# Patient Record
Sex: FEMALE | Race: WHITE | NOT HISPANIC OR LATINO | Employment: FULL TIME | URBAN - METROPOLITAN AREA
[De-identification: names, ages, dates, MRNs, and addresses within clinical notes are randomized per-mention and may not be internally consistent; named-entity substitution may affect disease eponyms.]

---

## 2017-06-07 ENCOUNTER — AMBULATORY - HEALTHEAST (OUTPATIENT)
Dept: MULTI SPECIALTY CLINIC | Facility: CLINIC | Age: 27
End: 2017-06-07

## 2017-06-07 LAB — PAP SMEAR - HIM PATIENT REPORTED: NORMAL

## 2017-10-08 ENCOUNTER — HEALTH MAINTENANCE LETTER (OUTPATIENT)
Age: 27
End: 2017-10-08

## 2019-01-22 ENCOUNTER — RECORDS - HEALTHEAST (OUTPATIENT)
Dept: ADMINISTRATIVE | Facility: OTHER | Age: 29
End: 2019-01-22

## 2019-03-28 ENCOUNTER — COMMUNICATION - HEALTHEAST (OUTPATIENT)
Dept: OBGYN | Facility: CLINIC | Age: 29
End: 2019-03-28

## 2019-03-29 ENCOUNTER — ANESTHESIA - HEALTHEAST (OUTPATIENT)
Dept: OBGYN | Facility: HOSPITAL | Age: 29
End: 2019-03-29

## 2019-03-29 ENCOUNTER — SURGERY - HEALTHEAST (OUTPATIENT)
Dept: OBGYN | Facility: HOSPITAL | Age: 29
End: 2019-03-29

## 2019-03-29 ASSESSMENT — MIFFLIN-ST. JEOR: SCORE: 1528.49

## 2019-04-01 ENCOUNTER — COMMUNICATION - HEALTHEAST (OUTPATIENT)
Dept: SCHEDULING | Facility: CLINIC | Age: 29
End: 2019-04-01

## 2019-04-01 ENCOUNTER — HOME CARE/HOSPICE - HEALTHEAST (OUTPATIENT)
Dept: HOME HEALTH SERVICES | Facility: HOME HEALTH | Age: 29
End: 2019-04-01

## 2019-04-03 ENCOUNTER — HOME CARE/HOSPICE - HEALTHEAST (OUTPATIENT)
Dept: HOME HEALTH SERVICES | Facility: HOME HEALTH | Age: 29
End: 2019-04-03

## 2019-09-13 ENCOUNTER — OFFICE VISIT - HEALTHEAST (OUTPATIENT)
Dept: FAMILY MEDICINE | Facility: CLINIC | Age: 29
End: 2019-09-13

## 2019-09-13 ENCOUNTER — COMMUNICATION - HEALTHEAST (OUTPATIENT)
Dept: TELEHEALTH | Facility: CLINIC | Age: 29
End: 2019-09-13

## 2019-09-13 DIAGNOSIS — H66.90 ACUTE OTITIS MEDIA, UNSPECIFIED OTITIS MEDIA TYPE: ICD-10-CM

## 2019-09-19 ENCOUNTER — COMMUNICATION - HEALTHEAST (OUTPATIENT)
Dept: SCHEDULING | Facility: CLINIC | Age: 29
End: 2019-09-19

## 2019-09-20 ENCOUNTER — OFFICE VISIT - HEALTHEAST (OUTPATIENT)
Dept: FAMILY MEDICINE | Facility: CLINIC | Age: 29
End: 2019-09-20

## 2019-09-20 DIAGNOSIS — H66.004 RECURRENT ACUTE SUPPURATIVE OTITIS MEDIA OF RIGHT EAR WITHOUT SPONTANEOUS RUPTURE OF TYMPANIC MEMBRANE: ICD-10-CM

## 2019-10-21 ENCOUNTER — OFFICE VISIT - HEALTHEAST (OUTPATIENT)
Dept: FAMILY MEDICINE | Facility: CLINIC | Age: 29
End: 2019-10-21

## 2019-10-21 DIAGNOSIS — Z71.1 FEARED COMPLAINT WITHOUT DIAGNOSIS: ICD-10-CM

## 2019-11-08 ENCOUNTER — HEALTH MAINTENANCE LETTER (OUTPATIENT)
Age: 29
End: 2019-11-08

## 2020-02-23 ENCOUNTER — HEALTH MAINTENANCE LETTER (OUTPATIENT)
Age: 30
End: 2020-02-23

## 2020-11-11 ENCOUNTER — OFFICE VISIT - HEALTHEAST (OUTPATIENT)
Dept: INTERNAL MEDICINE | Facility: CLINIC | Age: 30
End: 2020-11-11

## 2020-11-11 DIAGNOSIS — Z76.89 ESTABLISHING CARE WITH NEW DOCTOR, ENCOUNTER FOR: ICD-10-CM

## 2020-11-11 DIAGNOSIS — Z00.00 HEALTHCARE MAINTENANCE: ICD-10-CM

## 2020-11-11 DIAGNOSIS — Z3A.09 9 WEEKS GESTATION OF PREGNANCY: ICD-10-CM

## 2020-11-11 LAB
ALBUMIN SERPL-MCNC: 4 G/DL (ref 3.5–5)
ALBUMIN UR-MCNC: NEGATIVE MG/DL
ALP SERPL-CCNC: 29 U/L (ref 45–120)
ALT SERPL W P-5'-P-CCNC: 12 U/L (ref 0–45)
ANION GAP SERPL CALCULATED.3IONS-SCNC: 9 MMOL/L (ref 5–18)
APPEARANCE UR: CLEAR
AST SERPL W P-5'-P-CCNC: 14 U/L (ref 0–40)
BILIRUB SERPL-MCNC: 0.6 MG/DL (ref 0–1)
BILIRUB UR QL STRIP: NEGATIVE
BUN SERPL-MCNC: 10 MG/DL (ref 8–22)
CALCIUM SERPL-MCNC: 9.2 MG/DL (ref 8.5–10.5)
CHLORIDE BLD-SCNC: 106 MMOL/L (ref 98–107)
CHOLEST SERPL-MCNC: 177 MG/DL
CO2 SERPL-SCNC: 22 MMOL/L (ref 22–31)
COLOR UR AUTO: YELLOW
CREAT SERPL-MCNC: 0.72 MG/DL (ref 0.6–1.1)
ERYTHROCYTE [DISTWIDTH] IN BLOOD BY AUTOMATED COUNT: 11.8 % (ref 11–14.5)
FASTING STATUS PATIENT QL REPORTED: YES
GFR SERPL CREATININE-BSD FRML MDRD: >60 ML/MIN/1.73M2
GLUCOSE BLD-MCNC: 91 MG/DL (ref 70–125)
GLUCOSE UR STRIP-MCNC: NEGATIVE MG/DL
HCT VFR BLD AUTO: 42.2 % (ref 35–47)
HDLC SERPL-MCNC: 62 MG/DL
HGB BLD-MCNC: 14.4 G/DL (ref 12–16)
HGB UR QL STRIP: NEGATIVE
KETONES UR STRIP-MCNC: NEGATIVE MG/DL
LDLC SERPL CALC-MCNC: 92 MG/DL
LEUKOCYTE ESTERASE UR QL STRIP: NEGATIVE
MCH RBC QN AUTO: 31 PG (ref 27–34)
MCHC RBC AUTO-ENTMCNC: 34 G/DL (ref 32–36)
MCV RBC AUTO: 91 FL (ref 80–100)
NITRATE UR QL: NEGATIVE
PH UR STRIP: 8.5 [PH] (ref 5–8)
PLATELET # BLD AUTO: 251 THOU/UL (ref 140–440)
PMV BLD AUTO: 7.8 FL (ref 7–10)
POTASSIUM BLD-SCNC: 3.9 MMOL/L (ref 3.5–5)
PROT SERPL-MCNC: 6.8 G/DL (ref 6–8)
RBC # BLD AUTO: 4.64 MILL/UL (ref 3.8–5.4)
SODIUM SERPL-SCNC: 137 MMOL/L (ref 136–145)
SP GR UR STRIP: 1.02 (ref 1–1.03)
TRIGL SERPL-MCNC: 113 MG/DL
UROBILINOGEN UR STRIP-ACNC: ABNORMAL
WBC: 5.7 THOU/UL (ref 4–11)

## 2020-11-11 ASSESSMENT — MIFFLIN-ST. JEOR: SCORE: 1390.14

## 2020-11-12 ENCOUNTER — COMMUNICATION - HEALTHEAST (OUTPATIENT)
Dept: INTERNAL MEDICINE | Facility: CLINIC | Age: 30
End: 2020-11-12

## 2020-11-12 LAB — 25(OH)D3 SERPL-MCNC: 21 NG/ML (ref 30–80)

## 2020-12-06 ENCOUNTER — HEALTH MAINTENANCE LETTER (OUTPATIENT)
Age: 30
End: 2020-12-06

## 2021-05-26 VITALS
TEMPERATURE: 98.2 F | RESPIRATION RATE: 16 BRPM | HEART RATE: 60 BPM | DIASTOLIC BLOOD PRESSURE: 76 MMHG | SYSTOLIC BLOOD PRESSURE: 116 MMHG | OXYGEN SATURATION: 98 %

## 2021-05-27 NOTE — TELEPHONE ENCOUNTER
Called pt re: induction.  She states she is not coming as she has been laboring at home with her .  She will keep me updated throughout the day

## 2021-05-27 NOTE — ANESTHESIA PROCEDURE NOTES
Spinal Block    Patient location during procedure: OR  Start time: 3/29/2019 4:29 PM  End time: 3/29/2019 4:36 PM  Reason for block: primary anesthetic    Staffing:  Performing  Anesthesiologist: Jalil Garcia MD    Preanesthetic Checklist  Completed: patient identified, risks, benefits, and alternatives discussed, timeout performed, consent obtained, airway assessed, oxygen available, suction available, emergency drugs available and hand hygiene performed  Spinal Block  Patient position: sitting  Prep: ChloraPrep  Patient monitoring: heart rate, cardiac monitor, continuous pulse ox and blood pressure  Approach: midline  Location: L2-3  Injection technique: single-shot  Needle type: pencil-tip   Needle gauge: 24 G    Assessment  Sensory level: not assessed at this time.  Events: paresthesia    Additional Notes:  1st attempt L3-4, unable to get past bone.  In at L2-3, transient paresthesia to left leg, needle redirected to right.  Free and clear CSF flow pre- and post-injection.

## 2021-05-27 NOTE — ANESTHESIA PROCEDURE NOTES
Peripheral Block    Patient location during procedure: OR  Start time: 3/29/2019 5:33 PM  End time: 3/29/2019 5:40 PM  post-op analgesia per surgeon order as noted in medical record  Staffing:  Performing  Anesthesiologist: Jalil Garcia MD  Preanesthetic Checklist  Completed: patient identified, site marked, risks, benefits, and alternatives discussed, timeout performed, consent obtained, airway assessed, oxygen available, suction available, emergency drugs available and hand hygiene performed  Peripheral Block  Block type: other, TAP  Prep: ChloraPrep  Patient position: supine  Patient monitoring: blood pressure, heart rate, continuous pulse oximetry and cardiac monitor  Laterality: bilateral  Injection technique: ultrasound guided    Ultrasound used to visualize needle placement in proximity to nerve being blocked: yes   Permanent ultrasound image captured for medical record  Sterile gel and probe cover used for ultrasound.    Needle  Needle type: Stimuplex   Needle gauge: 20G  Needle length: 4 in  no peripheral nerve catheter placed  Assessment  Injection assessment: no difficulty with injection, negative aspiration for heme, incremental injection and no paresthesia on injection  Additional Notes  Images printed and placed in pt's chart.

## 2021-05-27 NOTE — TELEPHONE ENCOUNTER
"Pt was discharged from [facility] for a , and is concerned about having a DVT. Pt notes pain in left calf, no pain in the right. Pt is rating it about a \"2\", but is concerned.   Per protocol pt needs to be evaluated within 4 hours. Advised pt to go to the ER.     Florencio Jeffers RN [facility] Triage/Medication Refill    Reason for Disposition    [1] Thigh or calf pain AND [2] only 1 side AND [3] present > 1 hour    Protocols used: POSTPARTUM - LEG PAIN-A-AH      "

## 2021-05-27 NOTE — ANESTHESIA POSTPROCEDURE EVALUATION
Patient: Lisa MEDELLIN Cross   SECTION  Anesthesia type: spinal    Patient location: Labor and Delivery  Last vitals:   Vitals:    19 0825   BP: 115/69   Pulse: (!) 57   Resp: 18   Temp: 36.4  C (97.6  F)   SpO2: 97%     Post vital signs: stable  Level of consciousness: awake and responds to simple questions  Post-anesthesia pain: pain controlled  Post-anesthesia nausea and vomiting: no  Pulmonary: unassisted, return to baseline  Cardiovascular: stable and blood pressure at baseline  Hydration: adequate  Anesthetic events: no    QCDR Measures:  ASA# 11 - Isamar-op Cardiac Arrest: ASA11B - Patient did NOT experience unanticipated cardiac arrest  ASA# 12 - Isamar-op Mortality Rate: ASA12B - Patient did NOT die  ASA# 13 - PACU Re-Intubation Rate: ASA13B - Patient did NOT require a new airway mgmt  ASA# 10 - Composite Anes Safety: ASA10A - No serious adverse event    Additional Notes:

## 2021-05-27 NOTE — ANESTHESIA PREPROCEDURE EVALUATION
"Anesthesia Evaluation      Patient summary reviewed   No history of anesthetic complications     Airway   Mallampati: II  Neck ROM: full   Pulmonary - negative ROS and normal exam                          Cardiovascular - normal exam  Exercise tolerance: > or = 4 METS   Neuro/Psych - negative ROS     Endo/Other    (+) pregnant     GI/Hepatic/Renal - negative ROS   (-) GERD     Other findings: Hg 14.3. Denies medical issues.      Dental - normal exam                        Anesthesia Plan  Planned anesthetic: spinal  C-S described as \"urgent\" by OB, appears we have time for a SAB.  Pt consented for this, duramorph, and TAP blocks should they be requested.  ASA 2     Anesthetic plan and risks discussed with: patient and spouse    Post-op plan: routine recovery          "

## 2021-05-27 NOTE — ANESTHESIA CARE TRANSFER NOTE
Last vitals:   Vitals:    03/29/19 1758   BP: 93/52   Pulse: 97   Resp: 16   Temp:  SpO2 97.5  98%     Patient's level of consciousness is drowsy  Spontaneous respirations: yes  Maintains airway independently: yes  Dentition unchanged: yes  Oropharynx: oropharynx clear of all foreign objects    QCDR Measures:  ASA# 20 - Surgical Safety Checklist: WHO surgical safety checklist completed prior to induction    PQRS# 430 - Adult PONV Prevention: 4558F - Pt received => 2 anti-emetic agents (different classes) preop & intraop  ASA# 8 - Peds PONV Prevention: NA - Not pediatric patient, not GA or 2 or more risk factors NOT present  PQRS# 424 - Isamar-op Temp Management: 4559F - At least one body temp DOCUMENTED => 35.5C or 95.9F within required timeframe  PQRS# 426 - PACU Transfer Protocol: - Transfer of care checklist used  ASA# 14 - Acute Post-op Pain: ASA14B - Patient did NOT experience pain >= 7 out of 10

## 2021-06-01 NOTE — TELEPHONE ENCOUNTER
Pt is calling in about a right ear infection she has had for over a week, and saw Dr. Vale on 9/13/2019. Pt was given antibiotics, but has completed them and still feels fullness in her ear, and has hearing loss in right ear. Pt also reports some ringing in her ear. Pt denies any drainage, and the pain has improved. Pt is mostly concerned about the hearing loss.   Per protocol pt should be seen within 4-24 hours. Pt agrees with plan, and was transferred to scheduling, but was not able to get an appointment, so she will go to the [facility] right away in the morning. Pt agrees to call back if symptoms worsen before she is able to be seen.     Florencio Jeffers, RN [facility] Triage/Medication Refill    Reason for Disposition    [1] Hearing loss in one or both ears AND [2] sudden onset AND [3] present now    Earache    Protocols used: HEARING LOSS OR CHANGE-A-AH

## 2021-06-01 NOTE — PROGRESS NOTES
ASSESSMENT:  1. Acute otitis media  Bilateral, however right is more pronounced than left.  - amoxicillin (AMOXIL) 500 MG capsule; Take 1 capsule (500 mg total) by mouth 3 (three) times a day for 5 days. Take 8 hours apart with food.  Dispense: 15 capsule; Refill: 0        PLAN:  1.  Amoxicillin 500 mg 3 times daily x5 days.  2.  She can continue Tylenol and/or Motrin.  3.  She can also try Sudafed for congestion.  4.  Follow-up as needed.    No orders of the defined types were placed in this encounter.    There are no discontinued medications.    Return in about 3 months (around 12/13/2019) for establish care with someone .    CHIEF COMPLAINT:  Chief Complaint   Patient presents with     Ear Pain     new patient with ear pain that she noticed this morning. Advil helped, but wanting to make sure nothing wrong over the weekend.      Illness     feels like sinus drainage as she has been sick the past week        SUBJECTIVE:  Lisa is a 29 y.o. female who comes in because of ear pain.  Patient is otherwise healthy for the past several days or so she has had some coughing and congestion but no fever chills.  She woke up this morning about 3 AM with acute right ear pain, she laid down and realize actually both ears her but the right more than the left.  She also is not hearing as well and sounds are more muffled.    She is breast-feeding a 6-month-old, but otherwise is been no other significant change in her health.  She is taking some Motrin for this.        REVIEW OF SYSTEMS:      All other systems are negative.    PFSH:  Immunization History   Administered Date(s) Administered     Influenza, inj, historic,unspecified 11/30/2018     Social History     Socioeconomic History     Marital status:      Spouse name: Ramin     Number of children: 1     Years of education: Not on file     Highest education level: Not on file   Occupational History     Occupation: Web researcher   Social Needs     Financial resource  strain: Not hard at all     Food insecurity:     Worry: Never true     Inability: Never true     Transportation needs:     Medical: No     Non-medical: No   Tobacco Use     Smoking status: Never Smoker     Smokeless tobacco: Never Used   Substance and Sexual Activity     Alcohol use: No     Frequency: Never     Binge frequency: Never     Drug use: No     Sexual activity: Yes     Partners: Male   Lifestyle     Physical activity:     Days per week: Not on file     Minutes per session: Not on file     Stress: Not on file   Relationships     Social connections:     Talks on phone: Not on file     Gets together: Not on file     Attends Roman [employer] service: Not on file     Active member of club or organization: Not on file     Attends meetings of clubs or organizations: Not on file     Relationship status: Not on file     Intimate partner violence:     Fear of current or ex partner: Not on file     Emotionally abused: Not on file     Physically abused: Not on file     Forced sexual activity: Not on file   Other Topics Concern     Not on file   Social History Narrative     Not on file     No past medical history on file.  Family History   Problem Relation Age of Onset     Diabetes type II Mother      Hypertension Mother        MEDICATIONS:  Current Outpatient Medications   Medication Sig Dispense Refill     prenatal no115-iron-folic acid 29 mg iron- 1 mg Chew Chew 1 tablet daily.       amoxicillin (AMOXIL) 500 MG capsule Take 1 capsule (500 mg total) by mouth 3 (three) times a day for 5 days. Take 8 hours apart with food. 15 capsule 0     oxyCODONE (ROXICODONE) 5 MG immediate release tablet Take 1-2 tablets (5-10 mg total) by mouth every 6 (six) hours as needed. 13 tablet 0     No current facility-administered medications for this visit.        TOBACCO USE:  Social History     Tobacco Use   Smoking Status Never Smoker   Smokeless Tobacco Never Used       VITALS:  Vitals:    09/13/19 1306   BP: 123/77   Pulse: 67   Temp: 98.1   F (36.7  C)   TempSrc: Oral   SpO2: 98%   Weight: 150 lb 3.2 oz (68.1 kg)     Wt Readings from Last 3 Encounters:   09/13/19 150 lb 3.2 oz (68.1 kg)   03/29/19 182 lb (82.6 kg)       PHYSICAL EXAM:  Constitutional:   Reveals a female who appears healthy.  Vitals: per nursing notes.  HEENT:  Ears:  External canals, right tympanic membrane is diffusely red, and slightly dull suggestive of fluid behind the membrane no evidence of perforation no earwax.  The left tympanic membrane is also red but not as red as the right.    Eyes:  EOMs full, PERRL.  Lungs: Clear to A&P without rales or wheezes.  Respiratory effort normal.  Cardiac:   Regular rate and rhythm, normal S1, S2, no murmur or gallop.  Musculoskeletal: No peripheral swelling.  Neuro:  Alert and oriented. Cranial nerves, motor, sensory exams are intact.  No gross focal deficits.  Psychiatric:  Memory intact, mood appropriate.    QUALITY MEASURES:      DATA REVIEWED:

## 2021-06-01 NOTE — PROGRESS NOTES
Assessment/Plan:         Lisa was seen today for ear pain.    Diagnoses and all orders for this visit:    Recurrent acute suppurative otitis media of right ear without spontaneous rupture of tympanic membrane: recurrence (more likely incomplete treatment) of otitis media. Will treat with with omnicef this time. Continue to push fluids. Discussed concerning symptoms for which to return.  -     cefdinir (OMNICEF) 300 MG capsule; Take 1 capsule (300 mg total) by mouth 2 (two) times a day for 10 days.                Plan of care was discussed with the patient and/or guardian. They verbalize understanding of the treatment options and plan of care.    Jennifer Jose       Subjective:        Lisa Hernandez is a 29 y.o. female who presents for right ear pain.   She was treated on 9/13 for otitis media bilateral with a 5d course of amoxicillin   Had the pain immediately resolve the next day. Completed the course. Now starting over the last couple of days, the pain in her right ear is back.   No fever, chills, nausea, dizziness.  Mild nasal congestion, no cough.     She is breastfeeding. Otherwise healthy. No daily meds except prenatal.          Objective:       Blood pressure 103/66, pulse 62, temperature 97.4  F (36.3  C), temperature source Oral, resp. rate 15, weight 149 lb (67.6 kg), SpO2 98 %, currently breastfeeding.   Gen: well appearing, no distress  Ears: Right TM is bulging, erythematous, loss of red light reflex, Left TM with only clear fluid.

## 2021-06-02 VITALS — WEIGHT: 182 LBS | BODY MASS INDEX: 30.32 KG/M2 | HEIGHT: 65 IN

## 2021-06-02 NOTE — PATIENT INSTRUCTIONS - HE
1. May use Tylenol as needed for pain relief.   2. Follow up if ear pain worsens, fever develops, or if ear pain becomes more persistent.

## 2021-06-02 NOTE — PROGRESS NOTES
Chief Complaint   Patient presents with     Ear Pain     Right ear        HPI:  Lisa Hernandez is a 29 y.o. female who presents today complaining of some intermittent right ear twinges of pain.  Patient was recently diagnosed with an ear infection that required 2 rounds of antibiotics.  Patient has had not had any persistent ear pain like she had previously with ear infections, but she wanted make sure that there was no early signs of development of the infection again.    History obtained from the patient.    Problem List:  2019: Pregnant  2019:  delivery delivered      No past medical history on file.    Social History     Tobacco Use     Smoking status: Never Smoker     Smokeless tobacco: Never Used   Substance Use Topics     Alcohol use: No     Frequency: Never     Binge frequency: Never       Review of Systems   Constitutional: Negative for fever.   HENT: Positive for ear pain. Negative for congestion and ear discharge.    Respiratory: Negative for cough.        Vitals:    10/21/19 1736   BP: 116/76   Patient Site: Right Arm   Patient Position: Sitting   Cuff Size: Adult Regular   Pulse: 60   Resp: 16   Temp: 98.2  F (36.8  C)   TempSrc: Oral   SpO2: 98%       Physical Exam  Constitutional:       General: She is not in acute distress.     Appearance: She is well-developed. She is not diaphoretic.   HENT:      Head: Normocephalic and atraumatic.      Right Ear: Tympanic membrane, ear canal and external ear normal.      Left Ear: Tympanic membrane, ear canal and external ear normal.   Eyes:      General:         Right eye: No discharge.         Left eye: No discharge.      Conjunctiva/sclera: Conjunctivae normal.   Pulmonary:      Effort: Pulmonary effort is normal. No respiratory distress.   Psychiatric:         Behavior: Behavior normal.         Thought Content: Thought content normal.         Judgement: Judgment normal.           Clinical Decision Making:  No current signs indicative of bacterial  infection.  Patient is reassured.  At the end of the encounter, I discussed results, diagnosis, medications. Discussed red flags for immediate return to clinic/ER, as well as indications for follow up if no improvement. Patient understood and agreed to plan. Patient was stable for discharge.    1. Feared complaint without diagnosis           Patient Instructions   1. May use Tylenol as needed for pain relief.   2. Follow up if ear pain worsens, fever develops, or if ear pain becomes more persistent.

## 2021-06-03 VITALS
WEIGHT: 149 LBS | TEMPERATURE: 97.4 F | SYSTOLIC BLOOD PRESSURE: 103 MMHG | RESPIRATION RATE: 15 BRPM | HEART RATE: 62 BPM | BODY MASS INDEX: 25.18 KG/M2 | OXYGEN SATURATION: 98 % | DIASTOLIC BLOOD PRESSURE: 66 MMHG

## 2021-06-03 VITALS
HEART RATE: 67 BPM | BODY MASS INDEX: 25.38 KG/M2 | OXYGEN SATURATION: 98 % | WEIGHT: 150.2 LBS | SYSTOLIC BLOOD PRESSURE: 123 MMHG | TEMPERATURE: 98.1 F | DIASTOLIC BLOOD PRESSURE: 77 MMHG

## 2021-06-05 VITALS
DIASTOLIC BLOOD PRESSURE: 62 MMHG | BODY MASS INDEX: 25.24 KG/M2 | SYSTOLIC BLOOD PRESSURE: 117 MMHG | WEIGHT: 151.5 LBS | OXYGEN SATURATION: 100 % | HEIGHT: 65 IN | HEART RATE: 60 BPM

## 2021-06-06 ENCOUNTER — ANESTHESIA - HEALTHEAST (OUTPATIENT)
Dept: OBGYN | Facility: CLINIC | Age: 31
End: 2021-06-06

## 2021-06-07 ENCOUNTER — COMMUNICATION - HEALTHEAST (OUTPATIENT)
Dept: SCHEDULING | Facility: CLINIC | Age: 31
End: 2021-06-07

## 2021-06-13 NOTE — PROGRESS NOTES
Assessment:     Healthy female exam.   All preventive exams are up-to-date.  Fasting labs will be done today.      This note has been dictated using voice recognition software. Any grammatical or context distortions are unintentional and inherent to the software    1. Establishing care with new doctor, encounter for     2. Healthcare maintenance  Urinalysis-UC if Indicated    Vitamin D, Total (25-Hydroxy)    Lipid Profile    HM2(CBC w/o Differential)    Comprehensive Metabolic Panel   3. 9 weeks gestation of pregnancy           There are no Patient Instructions on file for this visit.    Plan:          Return in about 1 year (around 11/11/2021) for Annual physical.    Subjective:       Chief Complaint   Patient presents with     Annual Exam     EC Pap 9-7-69-Patint is 9 Weeks pregnant       Lisa Hernandez is a 30 y.o. female who presents for an annual exam. No chronic medical problems. 9 GW, second pregnancy. Had CS in 2019. Will see OB/GYN this week.    Healthy Habits:   Regular Exercise: Yes  Sunscreen Use: Yes  Healthy Diet: Yes  Dental Visits Regularly: Yes  Seat Belt: Yes  Immunization status: up to date and documented.    Health Maintenance   Topic Date Due     HEPATITIS C SCREENING  1989     PREVENTIVE CARE VISIT  1989     HIV SCREENING  02/01/2004     TD 18+ HE  02/01/2007     TDAP ADULT ONE TIME DOSE  02/01/2007     ADVANCE CARE PLANNING  02/01/2007     PAP SMEAR  06/07/2020     INFLUENZA VACCINE RULE BASED (1) 08/01/2020     Pneumococcal Vaccine: Pediatrics (0 to 5 Years) and At-Risk Patients (6 to 64 Years)  Aged Out     HEPATITIS B VACCINES  Aged Out       Social History     Socioeconomic History     Marital status:      Spouse name: Ramin     Number of children: 1     Years of education: Not on file     Highest education level: Not on file   Occupational History     Occupation: Web researcher   Social Needs     Financial resource strain: Not hard at all     Food insecurity     Worry:  "Never true     Inability: Never true     Transportation needs     Medical: No     Non-medical: No   Tobacco Use     Smoking status: Never Smoker     Smokeless tobacco: Never Used   Substance and Sexual Activity     Alcohol use: No     Frequency: Never     Binge frequency: Never     Drug use: No     Sexual activity: Yes     Partners: Male   Lifestyle     Physical activity     Days per week: Not on file     Minutes per session: Not on file     Stress: Not on file   Relationships     Social connections     Talks on phone: Not on file     Gets together: Not on file     Attends Rastafari service: Not on file     Active member of club or organization: Not on file     Attends meetings of clubs or organizations: Not on file     Relationship status: Not on file     Intimate partner violence     Fear of current or ex partner: Not on file     Emotionally abused: Not on file     Physically abused: Not on file     Forced sexual activity: Not on file   Other Topics Concern     Not on file   Social History Narrative     Not on file       Family History   Problem Relation Age of Onset     Diabetes type II Mother      Hypertension Mother        Patient Active Problem List   Diagnosis     Pregnant      delivery delivered       Current Outpatient Medications on File Prior to Visit   Medication Sig Dispense Refill     prenatal no115-iron-folic acid 29 mg iron- 1 mg Chew Chew 1 tablet daily.       No current facility-administered medications on file prior to visit.        Review of Systems  A 12 point comprehensive review of systems was negative except as noted in HPI.         Objective:      /62   Pulse 60   Ht 5' 4.5\" (1.638 m)   Wt 151 lb 8 oz (68.7 kg)   SpO2 100%   BMI 25.60 kg/m      Body mass index is 25.6 kg/m .        Physical Exam:  General Appearance: Alert, cooperative, no distress, appears stated age.  Head: Normocephalic, without obvious abnormality, atraumatic  Eyes: PERRL, conjunctiva/corneas clear, " EOM's intact  Ears: Normal TM's and external ear canals, both ears  Nose: Nares normal, septum midline,mucosa normal, no drainage  Throat: Lips, mucosa, and tongue normal; teeth and gums normal  Neck: Supple, symmetrical, trachea midline, no adenopathy;  thyroid: not enlarged, symmetric, no tenderness/mass/nodules; no carotid bruit or JVD  Back: Symmetric, no curvature, ROM normal, no CVA tenderness.  Lungs: Clear to auscultation bilaterally, respirations unlabored.  Breasts: No breast masses, tenderness, asymmetry, or nipple discharge.  Heart: Regular rate and rhythm, S1 and S2 normal, no murmur, rub, or gallop.  Abdomen: Soft, non-tender, bowel sounds active all four quadrants,  no masses, no organomegaly.  Pelvic:Not done.  Extremities: Extremities normal, atraumatic, no cyanosis or edema.  Skin: Skin color, texture, turgor normal, no rashes or lesions.  Lymph nodes: Cervical, supraclavicular, and axillary nodes normal.  Neurologic: No focal neurological findings.

## 2021-06-21 NOTE — LETTER
Letter by Alla iDckinson MD at      Author: Alla Dickinson MD Service: -- Author Type: --    Filed:  Encounter Date: 11/12/2020 Status: (Other)         Lisa Hernandez  [address] [facility] [address]             November 12, 2020         Dear Ms. Hernandez,    Below are the results from your recent visit:    Resulted Orders   Urinalysis-UC if Indicated   Result Value Ref Range    Color, UA Yellow Colorless, Yellow, Straw, Light Yellow    Clarity, UA Clear Clear    Glucose, UA Negative Negative    Bilirubin, UA Negative Negative    Ketones, UA Negative Negative    Specific Gravity, UA 1.020 1.005 - 1.030    Blood, UA Negative Negative    pH, UA 8.5 (H) 5.0 - 8.0    Protein, UA Negative Negative mg/dL    Urobilinogen, UA 0.2 E.U./dL 0.2 E.U./dL, 1.0 E.U./dL    Nitrite, UA Negative Negative    Leukocytes, UA Negative Negative    Narrative    Microscopic not indicated  UC not indicated   Vitamin D, Total (25-Hydroxy)   Result Value Ref Range    Vitamin D, Total (25-Hydroxy) 21.0 (L) 30.0 - 80.0 ng/mL    Narrative    Deficiency <10.0 ng/mL  Insufficiency 10.0-29.9 ng/mL  Sufficiency 30.0-80.0 ng/mL  Toxicity (possible) >100.0 ng/mL   Lipid Profile   Result Value Ref Range    Triglycerides 113 <=149 mg/dL    Cholesterol 177 <=199 mg/dL    LDL Calculated 92 <=129 mg/dL    HDL Cholesterol 62 >=50 mg/dL    Patient Fasting > 8hrs? Yes    HM2(CBC w/o Differential)   Result Value Ref Range    WBC 5.7 4.0 - 11.0 thou/uL    RBC 4.64 3.80 - 5.40 mill/uL    Hemoglobin 14.4 12.0 - 16.0 g/dL    Hematocrit 42.2 35.0 - 47.0 %    MCV 91 80 - 100 fL    MCH 31.0 27.0 - 34.0 pg    MCHC 34.0 32.0 - 36.0 g/dL    RDW 11.8 11.0 - 14.5 %    Platelets 251 140 - 440 thou/uL    MPV 7.8 7.0 - 10.0 fL   Comprehensive Metabolic Panel   Result Value Ref Range    Sodium 137 136 - 145 mmol/L    Potassium 3.9 3.5 - 5.0 mmol/L    Chloride 106 98 - 107 mmol/L    CO2 22 22 - 31 mmol/L    Anion Gap, Calculation 9 5 - 18 mmol/L    Glucose 91 70  - 125 mg/dL    BUN 10 8 - 22 mg/dL    Creatinine 0.72 0.60 - 1.10 mg/dL    GFR MDRD Af Amer >60 >60 mL/min/1.73m2    GFR MDRD Non Af Amer >60 >60 mL/min/1.73m2    Bilirubin, Total 0.6 0.0 - 1.0 mg/dL    Calcium 9.2 8.5 - 10.5 mg/dL    Protein, Total 6.8 6.0 - 8.0 g/dL    Albumin 4.0 3.5 - 5.0 g/dL    Alkaline Phosphatase 29 (L) 45 - 120 U/L    AST 14 0 - 40 U/L    ALT 12 0 - 45 U/L    Narrative    Fasting Glucose reference range is 70-99 mg/dL per  American Diabetes Association (ADA) guidelines.       Stable results. Only vit D level is low. Please start taking 2000 international unit(s) vit D3 OTC daily.    Please call with questions or contact us using [employer].    Sincerely,        Electronically signed by Alla Dickinson MD

## 2021-06-26 NOTE — ANESTHESIA PROCEDURE NOTES
Epidural Block    Patient location during procedure: OB  Time Called: 6/6/2021 7:45 PM  Reason for Block:labor epidural  Staffing:  Performing  Anesthesiologist: Nayeli Santos MD  Preanesthetic Checklist  Completed: patient identified, risks, benefits, and alternatives discussed, timeout performed, consent obtained, airway assessed, oxygen available, suction available, emergency drugs available and hand hygiene performed  Procedure  Patient position: sitting  Prep: ChloraPrep  Patient monitoring: heart rate, blood pressure and continuous pulse oximetry  Approach: midline  Location: L3-L4  Injection technique: MARIFER saline  Number of Attempts:1  Needle  Needle type: Tuohy   Needle gauge: 18 G     Catheter in Space: 5  Assessment  Sensory level:  No complications      Additional Notes:  R/B/A discussed with pt who wished to proceed. Single pass for crisp MARIFER. Negative for heme. No parasthesias. Catheter threaded easily. Negative test dose. Pt tolerated procedure well. No complications.

## 2021-06-26 NOTE — ANESTHESIA PREPROCEDURE EVALUATION
Anesthesia Evaluation      Patient summary reviewed     Airway   Mallampati: II  Neck ROM: full   Pulmonary - negative ROS and normal exam                          Cardiovascular - negative ROS and normal exam   Neuro/Psych - negative ROS     Endo/Other    (+) pregnant     GI/Hepatic/Renal - negative ROS           Dental - normal exam                        Anesthesia Plan  Planned anesthetic: epidural    ASA 2     Anesthetic plan and risks discussed with: patient    Post-op plan: routine recovery

## 2021-06-26 NOTE — ANESTHESIA POSTPROCEDURE EVALUATION
Patient: Lisa Hernandez  * No procedures listed *  Anesthesia type: No value filed.    Patient location: Labor and Delivery  Last vitals: No vitals data found for the desired time range.    Post vital signs: stable  Level of consciousness: awake and responds to simple questions  Post-anesthesia pain: pain controlled  Post-anesthesia nausea and vomiting: no  Pulmonary: unassisted, return to baseline  Cardiovascular: stable and blood pressure at baseline  Hydration: adequate  Anesthetic events: no    QCDR Measures:  ASA# 11 - Isamar-op Cardiac Arrest: ASA11B - Patient did NOT experience unanticipated cardiac arrest  ASA# 12 - Isamar-op Mortality Rate: ASA12B - Patient did NOT die  ASA# 13 - PACU Re-Intubation Rate: ASA13B - Patient did NOT require a new airway mgmt  ASA# 10 - Composite Anes Safety: ASA10A - No serious adverse event    Additional Notes:Pain well controlled. Denies headache, nausea/vomiting. Pt ambulating and voiding with no issues. Motor function returned to baseline. No apparent anesthesia related complications. All questions and concerns answered.

## 2021-07-14 PROBLEM — Z34.90 PREGNANT: Status: RESOLVED | Noted: 2019-03-29 | Resolved: 2021-06-08

## 2021-08-25 ENCOUNTER — NURSE TRIAGE (OUTPATIENT)
Dept: NURSING | Facility: CLINIC | Age: 31
End: 2021-08-25

## 2021-08-26 ENCOUNTER — OFFICE VISIT (OUTPATIENT)
Dept: FAMILY MEDICINE | Facility: CLINIC | Age: 31
End: 2021-08-26
Payer: COMMERCIAL

## 2021-08-26 VITALS
WEIGHT: 159.3 LBS | DIASTOLIC BLOOD PRESSURE: 87 MMHG | BODY MASS INDEX: 27.13 KG/M2 | SYSTOLIC BLOOD PRESSURE: 120 MMHG | TEMPERATURE: 98.5 F | RESPIRATION RATE: 18 BRPM | OXYGEN SATURATION: 98 %

## 2021-08-26 DIAGNOSIS — R35.0 URINARY FREQUENCY: ICD-10-CM

## 2021-08-26 DIAGNOSIS — N64.4 NIPPLE PAIN: Primary | ICD-10-CM

## 2021-08-26 LAB
ALBUMIN UR-MCNC: 30 MG/DL
APPEARANCE UR: CLEAR
BACTERIA #/AREA URNS HPF: ABNORMAL /HPF
BILIRUB UR QL STRIP: ABNORMAL
COLOR UR AUTO: YELLOW
GLUCOSE UR STRIP-MCNC: NEGATIVE MG/DL
HGB UR QL STRIP: NEGATIVE
KETONES UR STRIP-MCNC: 15 MG/DL
LEUKOCYTE ESTERASE UR QL STRIP: NEGATIVE
MUCOUS THREADS #/AREA URNS LPF: PRESENT /LPF
NITRATE UR QL: NEGATIVE
PH UR STRIP: 5.5 [PH] (ref 5–8)
RBC #/AREA URNS AUTO: ABNORMAL /HPF
SP GR UR STRIP: 1.02 (ref 1–1.03)
SQUAMOUS #/AREA URNS AUTO: ABNORMAL /LPF
UROBILINOGEN UR STRIP-ACNC: 0.2 E.U./DL
WBC #/AREA URNS AUTO: ABNORMAL /HPF

## 2021-08-26 PROCEDURE — 81001 URINALYSIS AUTO W/SCOPE: CPT | Performed by: STUDENT IN AN ORGANIZED HEALTH CARE EDUCATION/TRAINING PROGRAM

## 2021-08-26 PROCEDURE — 99213 OFFICE O/P EST LOW 20 MIN: CPT | Performed by: STUDENT IN AN ORGANIZED HEALTH CARE EDUCATION/TRAINING PROGRAM

## 2021-08-26 NOTE — TELEPHONE ENCOUNTER
"Patient says she woke this am was doing ok but then started having aches, back hurts and stomach does not feel good. She is scheduled for a COVID-19 test tomorrow.  Patient has a 11 week old baby, and when she nurses on right side, it is painful to patient. She reports seeing white spots on her nipple. Baby has no white patches in her mouth.    Disposition: be seen w/i 2 weeks  Reviewed care advice with caller per RN triage protocol guideline.  Advised to call back with worsening symptoms, concerns or questions. Caller verbalized understanding.            Reason for Disposition    Breastfeeding questions about mother (breast symptoms or feeling sick)    [1] Breast pain AND [2] cause is not known    Additional Information    Negative: [1] SEVERE breast pain AND [2] fever > 103 F (39.4 C)    Negative: Patient sounds very sick or weak to the triager    Negative: [1Breast looks infected (spreading redness, feels hot or painful to touch) AND [2] fever    Negative: [1Breast looks infected (spreading redness, feels hot or painful to touch) AND [2] no fever    Negative: [1] Painful rash AND [2] multiple small blisters grouped together (i.e., dermatomal distribution or \"band\" or \"stripe\")    Negative: [1] Cuts, burns, or bruises of breasts AND [2] suspicious history for the injury    Negative: Breast lump    Negative: [1] Nipple discharge AND [2] bloody    Negative: Nipple is inverted (i.e., points inward)  (Exception: long-term physical characteristic, present for many years)    Negative: Dry flaking-peeling skin of nipple    Negative: Change in shape or appearance of breast    Negative: Dimpling of skin of breast  (i.e., skin looks like the outside of an orange peel)    Negative: [1] Breast tenderness and fullness AND [2] pregnant    Negative: [1] Nipple discharge AND [2] not bloody (e.g., clear, white, yellow, brown, green)    Negative: [1] Age < 12 weeks AND [2] fever 100.4 F (38.0 C) or higher rectally    Protocols " used: BREAST SYMPTOMS-A-AH, THRUSH-P-AH

## 2021-08-26 NOTE — PATIENT INSTRUCTIONS
Patient Education     Breastfeeding Holds  Step-by-Step    [place] last reviewed this educational content on 8/1/2020 2000-2021 [employer]. All rights reserved. This information is not intended as a substitute for professional medical care. Always follow your healthcare professional's instructions.

## 2021-08-26 NOTE — PROGRESS NOTES
There are no exam notes on file for this visit.  Chief Complaint   Patient presents with     feeling achy and stomach yesterday     fever last evening.  took tylenol.  woke up fever 101.8  questioning UTI or is breast feeding.  Right side breast pain and lump and is questioning possib infection there. Urinary frequency      Blood pressure 120/87, temperature 98.5  F (36.9  C), temperature source Oral, resp. rate 18, weight 72.3 kg (159 lb 4.8 oz), SpO2 98 %, currently breastfeeding.           HALLIE Gonzalez is a 31 year old  female with a PMH significant for:     Patient Active Problem List   Diagnosis     NO ACTIVE PROBLEMS     CARDIOVASCULAR SCREENING; LDL GOAL LESS THAN 160     She presents with complaints of right nipple pain and discomfort.  States that she sees a white lesion at the tip of her right nipple, present for the past few days.  Also describes feeling feverish yesterday evening, took 1 tablet of Tylenol with good resolution of her symptoms.  Patient wondering if she has infection in her right breast.  Associated symptoms include feeling achy with an upset stomach yesterday.  Denies any nausea, vomiting, fevers, chills, cough, congestion.  Thinks that she has more urinary frequency, but denies any vaginal irritation, dysuria, hematuria.        OBJECTIVE     Vitals:    08/26/21 1011   BP: 120/87   BP Location: Right arm   Patient Position: Sitting   Cuff Size: Adult Regular   Resp: 18   Temp: 98.5  F (36.9  C)   TempSrc: Oral   SpO2: 98%   Weight: 72.3 kg (159 lb 4.8 oz)     Body mass index is 27.13 kg/m .    Constitutional: Awake, alert, cooperative, no acute distress, and appears stated age.  Eyes: sclera clear, conjunctiva normal.  ENT: NC/AT.   Neck: Supple, symmetrical, trachea midline.   Chest: Right nipple with white lesion at the tip without any surrounding discharge, erythema, warmth.  Lungs: No increased WOB.   Abdomen: Soft, nontender, nondistended  Neuropsychiatric: Normal affect,  mood, orientation, memory and insight.  Skin: No visible rashes, erythema, pallor, petechia or purpura.    Results for orders placed or performed in visit on 08/26/21   UA macro with reflex to Microscopic and Culture - Clinc Collect     Status: Abnormal    Specimen: Urine, Clean Catch   Result Value Ref Range    Color Urine Yellow Colorless, Straw, Light Yellow, Yellow    Appearance Urine Clear Clear    Glucose Urine Negative Negative mg/dL    Bilirubin Urine Small (A) Negative    Ketones Urine 15  (A) Negative mg/dL    Specific Gravity Urine 1.025 1.005 - 1.030    Blood Urine Negative Negative    pH Urine 5.5 5.0 - 8.0    Protein Albumin Urine 30  (A) Negative mg/dL    Urobilinogen Urine 0.2 0.2, 1.0 E.U./dL    Nitrite Urine Negative Negative    Leukocyte Esterase Urine Negative Negative   Urine Microscopic     Status: Abnormal   Result Value Ref Range    Bacteria Urine Few (A) None Seen /HPF    RBC Urine None Seen 0-2 /HPF /HPF    WBC Urine None Seen 0-5 /HPF /HPF    Squamous Epithelials Urine None Seen None Seen /LPF    Mucus Urine Present (A) None Seen /LPF    Narrative    Urine Culture not indicated       ASSESSMENT AND PLAN     Lisa was seen today for feeling achy and stomach yesterday.    Diagnoses and all orders for this visit:    Nipple pain  Likely secondary to lactation.  Patient's daughter appears to have adequate suction, advised to rotate physician.  She does have a phone number from her pediatrician to call the lactation nurse for further management and evaluation.  Also provided patient with different breast-feeding holds.  No antibiotics indicated at this time.    Urinary frequency  UA does show some ketones, indicating possible dehydration.  Advised patient to stay hydrated continue taking prenatal vitamins while breast-feeding.  Return precautions advised.  -     UA macro with reflex to Microscopic and Culture - Clinc Collect  -     Urine Microscopic          Return if symptoms worsen or fail to  improve.    Catarino Harper MD  8/26/2021

## 2021-09-25 ENCOUNTER — HEALTH MAINTENANCE LETTER (OUTPATIENT)
Age: 31
End: 2021-09-25

## 2022-01-15 ENCOUNTER — HEALTH MAINTENANCE LETTER (OUTPATIENT)
Age: 32
End: 2022-01-15

## 2022-10-31 ASSESSMENT — ENCOUNTER SYMPTOMS
DYSURIA: 0
HEMATURIA: 0
PARESTHESIAS: 0
JOINT SWELLING: 0
ARTHRALGIAS: 0
DIZZINESS: 0
HEMATOCHEZIA: 0
FEVER: 0
PALPITATIONS: 0
CHILLS: 0
WEAKNESS: 0
HEARTBURN: 0
FREQUENCY: 0
CONSTIPATION: 0
HEADACHES: 0
DIARRHEA: 0
MYALGIAS: 0
ABDOMINAL PAIN: 0
NAUSEA: 0
SHORTNESS OF BREATH: 0
COUGH: 0
BREAST MASS: 0
NERVOUS/ANXIOUS: 0
SORE THROAT: 0
EYE PAIN: 0

## 2022-11-01 ENCOUNTER — OFFICE VISIT (OUTPATIENT)
Dept: INTERNAL MEDICINE | Facility: CLINIC | Age: 32
End: 2022-11-01
Payer: COMMERCIAL

## 2022-11-01 VITALS
DIASTOLIC BLOOD PRESSURE: 60 MMHG | BODY MASS INDEX: 24.92 KG/M2 | SYSTOLIC BLOOD PRESSURE: 98 MMHG | WEIGHT: 146 LBS | OXYGEN SATURATION: 98 % | HEART RATE: 57 BPM | HEIGHT: 64 IN

## 2022-11-01 DIAGNOSIS — E55.9 VITAMIN D DEFICIENCY: ICD-10-CM

## 2022-11-01 DIAGNOSIS — Z00.00 ANNUAL PHYSICAL EXAM: Primary | ICD-10-CM

## 2022-11-01 PROCEDURE — 99395 PREV VISIT EST AGE 18-39: CPT | Performed by: INTERNAL MEDICINE

## 2022-11-01 ASSESSMENT — ENCOUNTER SYMPTOMS
SHORTNESS OF BREATH: 0
CONSTIPATION: 0
JOINT SWELLING: 0
HEMATURIA: 0
NAUSEA: 0
SORE THROAT: 0
FEVER: 0
HEADACHES: 0
HEMATOCHEZIA: 0
DIARRHEA: 0
FREQUENCY: 0
DIZZINESS: 0
HEARTBURN: 0
PALPITATIONS: 0
MYALGIAS: 0
WEAKNESS: 0
PARESTHESIAS: 0
CHILLS: 0
ABDOMINAL PAIN: 0
DYSURIA: 0
NERVOUS/ANXIOUS: 0
EYE PAIN: 0
COUGH: 0
ARTHRALGIAS: 0
BREAST MASS: 0
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
## 2023-03-23 ENCOUNTER — LAB REQUISITION (OUTPATIENT)
Dept: LAB | Facility: CLINIC | Age: 33
End: 2023-03-23

## 2023-03-23 DIAGNOSIS — Z34.93 ENCOUNTER FOR SUPERVISION OF NORMAL PREGNANCY, UNSPECIFIED, THIRD TRIMESTER: ICD-10-CM

## 2023-03-23 LAB
BASOPHILS # BLD AUTO: 0.1 10E3/UL (ref 0–0.2)
BASOPHILS NFR BLD AUTO: 1 %
EOSINOPHIL # BLD AUTO: 0.1 10E3/UL (ref 0–0.7)
EOSINOPHIL NFR BLD AUTO: 1 %
ERYTHROCYTE [DISTWIDTH] IN BLOOD BY AUTOMATED COUNT: 12.6 % (ref 10–15)
HCT VFR BLD AUTO: 39.8 % (ref 35–47)
HGB BLD-MCNC: 13.3 G/DL (ref 11.7–15.7)
IMM GRANULOCYTES # BLD: 0 10E3/UL
IMM GRANULOCYTES NFR BLD: 0 %
LYMPHOCYTES # BLD AUTO: 1.8 10E3/UL (ref 0.8–5.3)
LYMPHOCYTES NFR BLD AUTO: 26 %
MCH RBC QN AUTO: 30.5 PG (ref 26.5–33)
MCHC RBC AUTO-ENTMCNC: 33.4 G/DL (ref 31.5–36.5)
MCV RBC AUTO: 91 FL (ref 78–100)
MONOCYTES # BLD AUTO: 0.3 10E3/UL (ref 0–1.3)
MONOCYTES NFR BLD AUTO: 5 %
NEUTROPHILS # BLD AUTO: 4.8 10E3/UL (ref 1.6–8.3)
NEUTROPHILS NFR BLD AUTO: 67 %
NRBC # BLD AUTO: 0 10E3/UL
NRBC BLD AUTO-RTO: 0 /100
PLATELET # BLD AUTO: 235 10E3/UL (ref 150–450)
RBC # BLD AUTO: 4.36 10E6/UL (ref 3.8–5.2)
WBC # BLD AUTO: 7.1 10E3/UL (ref 4–11)

## 2023-03-23 PROCEDURE — 86592 SYPHILIS TEST NON-TREP QUAL: CPT | Performed by: NURSE PRACTITIONER

## 2023-03-23 PROCEDURE — 87340 HEPATITIS B SURFACE AG IA: CPT | Performed by: NURSE PRACTITIONER

## 2023-03-23 PROCEDURE — 87389 HIV-1 AG W/HIV-1&-2 AB AG IA: CPT | Performed by: NURSE PRACTITIONER

## 2023-03-23 PROCEDURE — 87086 URINE CULTURE/COLONY COUNT: CPT | Performed by: NURSE PRACTITIONER

## 2023-03-23 PROCEDURE — 85025 COMPLETE CBC W/AUTO DIFF WBC: CPT | Performed by: NURSE PRACTITIONER

## 2023-03-23 PROCEDURE — 86803 HEPATITIS C AB TEST: CPT | Performed by: NURSE PRACTITIONER

## 2023-03-24 LAB
HBV SURFACE AG SERPL QL IA: NONREACTIVE
HCV AB SERPL QL IA: NONREACTIVE
HIV 1+2 AB+HIV1 P24 AG SERPL QL IA: NONREACTIVE
RPR SER QL: NONREACTIVE

## 2023-03-25 LAB — BACTERIA UR CULT: NORMAL

## 2023-04-19 ENCOUNTER — LAB REQUISITION (OUTPATIENT)
Dept: LAB | Facility: CLINIC | Age: 33
End: 2023-04-19

## 2023-04-19 DIAGNOSIS — Z34.81 ENCOUNTER FOR SUPERVISION OF OTHER NORMAL PREGNANCY, FIRST TRIMESTER: ICD-10-CM

## 2023-04-19 LAB
ABO/RH(D): NORMAL
ANTIBODY SCREEN: NEGATIVE
SPECIMEN EXPIRATION DATE: NORMAL

## 2023-04-19 PROCEDURE — 86850 RBC ANTIBODY SCREEN: CPT | Performed by: OBSTETRICS & GYNECOLOGY

## 2023-08-01 ENCOUNTER — LAB REQUISITION (OUTPATIENT)
Dept: LAB | Facility: CLINIC | Age: 33
End: 2023-08-01

## 2023-08-01 DIAGNOSIS — Z11.3 ENCOUNTER FOR SCREENING FOR INFECTIONS WITH A PREDOMINANTLY SEXUAL MODE OF TRANSMISSION: ICD-10-CM

## 2023-08-01 DIAGNOSIS — Z34.82 ENCOUNTER FOR SUPERVISION OF OTHER NORMAL PREGNANCY, SECOND TRIMESTER: ICD-10-CM

## 2023-08-01 LAB — T PALLIDUM AB SER QL: NONREACTIVE

## 2023-08-01 PROCEDURE — 86780 TREPONEMA PALLIDUM: CPT | Performed by: NURSE PRACTITIONER

## 2023-08-01 PROCEDURE — 86762 RUBELLA ANTIBODY: CPT | Performed by: NURSE PRACTITIONER

## 2023-08-02 LAB
RUBV IGG SERPL QL IA: 2.25 INDEX
RUBV IGG SERPL QL IA: POSITIVE

## 2023-10-04 ENCOUNTER — LAB REQUISITION (OUTPATIENT)
Dept: LAB | Facility: CLINIC | Age: 33
End: 2023-10-04
Payer: COMMERCIAL

## 2023-10-04 DIAGNOSIS — Z36.85 ENCOUNTER FOR ANTENATAL SCREENING FOR STREPTOCOCCUS B: ICD-10-CM

## 2023-10-04 PROCEDURE — 87653 STREP B DNA AMP PROBE: CPT | Mod: ORL | Performed by: OBSTETRICS & GYNECOLOGY

## 2023-10-05 LAB — GP B STREP DNA SPEC QL NAA+PROBE: POSITIVE

## 2023-10-27 ENCOUNTER — HOSPITAL ENCOUNTER (INPATIENT)
Facility: CLINIC | Age: 33
LOS: 2 days | Discharge: HOME HEALTH | End: 2023-10-29
Attending: OBSTETRICS & GYNECOLOGY | Admitting: OBSTETRICS & GYNECOLOGY
Payer: COMMERCIAL

## 2023-10-27 ENCOUNTER — ANESTHESIA EVENT (OUTPATIENT)
Dept: OBGYN | Facility: CLINIC | Age: 33
End: 2023-10-27
Payer: COMMERCIAL

## 2023-10-27 ENCOUNTER — ANESTHESIA (OUTPATIENT)
Dept: OBGYN | Facility: CLINIC | Age: 33
End: 2023-10-27
Payer: COMMERCIAL

## 2023-10-27 PROBLEM — Z34.90 PREGNANCY: Status: ACTIVE | Noted: 2023-10-27

## 2023-10-27 LAB
ABO/RH(D): NORMAL
ANTIBODY SCREEN: NEGATIVE
HGB BLD-MCNC: 13.2 G/DL (ref 11.7–15.7)
SPECIMEN EXPIRATION DATE: NORMAL
T PALLIDUM AB SER QL: NONREACTIVE

## 2023-10-27 PROCEDURE — 250N000011 HC RX IP 250 OP 636: Performed by: ANESTHESIOLOGY

## 2023-10-27 PROCEDURE — 250N000009 HC RX 250: Performed by: ANESTHESIOLOGY

## 2023-10-27 PROCEDURE — 710N000010 HC RECOVERY PHASE 1, LEVEL 2, PER MIN

## 2023-10-27 PROCEDURE — 370N000017 HC ANESTHESIA TECHNICAL FEE, PER MIN: Performed by: OBSTETRICS & GYNECOLOGY

## 2023-10-27 PROCEDURE — 86901 BLOOD TYPING SEROLOGIC RH(D): CPT | Performed by: PHYSICIAN ASSISTANT

## 2023-10-27 PROCEDURE — 258N000003 HC RX IP 258 OP 636: Performed by: PHYSICIAN ASSISTANT

## 2023-10-27 PROCEDURE — 999N000249 HC STATISTIC C-SECTION ON UNIT

## 2023-10-27 PROCEDURE — 36415 COLL VENOUS BLD VENIPUNCTURE: CPT | Performed by: PHYSICIAN ASSISTANT

## 2023-10-27 PROCEDURE — 250N000013 HC RX MED GY IP 250 OP 250 PS 637: Performed by: ANESTHESIOLOGY

## 2023-10-27 PROCEDURE — 85018 HEMOGLOBIN: CPT | Performed by: PHYSICIAN ASSISTANT

## 2023-10-27 PROCEDURE — 250N000013 HC RX MED GY IP 250 OP 250 PS 637: Performed by: PHYSICIAN ASSISTANT

## 2023-10-27 PROCEDURE — 120N000001 HC R&B MED SURG/OB

## 2023-10-27 PROCEDURE — 86850 RBC ANTIBODY SCREEN: CPT | Performed by: PHYSICIAN ASSISTANT

## 2023-10-27 PROCEDURE — 258N000003 HC RX IP 258 OP 636: Performed by: ANESTHESIOLOGY

## 2023-10-27 PROCEDURE — 250N000011 HC RX IP 250 OP 636: Mod: JZ | Performed by: PHYSICIAN ASSISTANT

## 2023-10-27 PROCEDURE — 999N000249 HC STATISTIC C-SECTION ON UNIT: Performed by: OBSTETRICS & GYNECOLOGY

## 2023-10-27 PROCEDURE — 360N000076 HC SURGERY LEVEL 3, PER MIN: Performed by: OBSTETRICS & GYNECOLOGY

## 2023-10-27 PROCEDURE — 250N000013 HC RX MED GY IP 250 OP 250 PS 637: Performed by: OBSTETRICS & GYNECOLOGY

## 2023-10-27 PROCEDURE — 86780 TREPONEMA PALLIDUM: CPT | Performed by: PHYSICIAN ASSISTANT

## 2023-10-27 PROCEDURE — 272N000001 HC OR GENERAL SUPPLY STERILE: Performed by: OBSTETRICS & GYNECOLOGY

## 2023-10-27 PROCEDURE — C9290 INJ, BUPIVACAINE LIPOSOME: HCPCS | Performed by: ANESTHESIOLOGY

## 2023-10-27 RX ORDER — DEXAMETHASONE SODIUM PHOSPHATE 4 MG/ML
INJECTION, SOLUTION INTRA-ARTICULAR; INTRALESIONAL; INTRAMUSCULAR; INTRAVENOUS; SOFT TISSUE PRN
Status: DISCONTINUED | OUTPATIENT
Start: 2023-10-27 | End: 2023-10-27

## 2023-10-27 RX ORDER — ACETAMINOPHEN 325 MG/1
975 TABLET ORAL ONCE
Status: COMPLETED | OUTPATIENT
Start: 2023-10-27 | End: 2023-10-27

## 2023-10-27 RX ORDER — ACETAMINOPHEN 325 MG/1
975 TABLET ORAL ONCE
Status: DISCONTINUED | OUTPATIENT
Start: 2023-10-27 | End: 2023-10-27 | Stop reason: HOSPADM

## 2023-10-27 RX ORDER — NALOXONE HYDROCHLORIDE 0.4 MG/ML
0.2 INJECTION, SOLUTION INTRAMUSCULAR; INTRAVENOUS; SUBCUTANEOUS
Status: DISCONTINUED | OUTPATIENT
Start: 2023-10-27 | End: 2023-10-29 | Stop reason: HOSPADM

## 2023-10-27 RX ORDER — OXYTOCIN/0.9 % SODIUM CHLORIDE 30/500 ML
PLASTIC BAG, INJECTION (ML) INTRAVENOUS CONTINUOUS PRN
Status: DISCONTINUED | OUTPATIENT
Start: 2023-10-27 | End: 2023-10-27

## 2023-10-27 RX ORDER — ACETAMINOPHEN 325 MG/1
975 TABLET ORAL EVERY 6 HOURS
Status: DISCONTINUED | OUTPATIENT
Start: 2023-10-27 | End: 2023-10-29 | Stop reason: HOSPADM

## 2023-10-27 RX ORDER — NALBUPHINE HYDROCHLORIDE 20 MG/ML
2.5-5 INJECTION, SOLUTION INTRAMUSCULAR; INTRAVENOUS; SUBCUTANEOUS EVERY 6 HOURS PRN
Status: DISCONTINUED | OUTPATIENT
Start: 2023-10-27 | End: 2023-10-29 | Stop reason: HOSPADM

## 2023-10-27 RX ORDER — OXYTOCIN/0.9 % SODIUM CHLORIDE 30/500 ML
100-340 PLASTIC BAG, INJECTION (ML) INTRAVENOUS CONTINUOUS PRN
Status: DISCONTINUED | OUTPATIENT
Start: 2023-10-27 | End: 2023-10-29 | Stop reason: HOSPADM

## 2023-10-27 RX ORDER — METHYLERGONOVINE MALEATE 0.2 MG/ML
200 INJECTION INTRAVENOUS
Status: DISCONTINUED | OUTPATIENT
Start: 2023-10-27 | End: 2023-10-29 | Stop reason: HOSPADM

## 2023-10-27 RX ORDER — CEFAZOLIN SODIUM 2 G/100ML
2 INJECTION, SOLUTION INTRAVENOUS
Status: DISCONTINUED | OUTPATIENT
Start: 2023-10-27 | End: 2023-10-27 | Stop reason: HOSPADM

## 2023-10-27 RX ORDER — METOCLOPRAMIDE HYDROCHLORIDE 5 MG/ML
10 INJECTION INTRAMUSCULAR; INTRAVENOUS EVERY 6 HOURS PRN
Status: DISCONTINUED | OUTPATIENT
Start: 2023-10-27 | End: 2023-10-29 | Stop reason: HOSPADM

## 2023-10-27 RX ORDER — FENTANYL CITRATE-0.9 % NACL/PF 10 MCG/ML
100 PLASTIC BAG, INJECTION (ML) INTRAVENOUS EVERY 5 MIN PRN
Status: DISCONTINUED | OUTPATIENT
Start: 2023-10-27 | End: 2023-10-27 | Stop reason: HOSPADM

## 2023-10-27 RX ORDER — LIDOCAINE 40 MG/G
CREAM TOPICAL
Status: DISCONTINUED | OUTPATIENT
Start: 2023-10-27 | End: 2023-10-29 | Stop reason: HOSPADM

## 2023-10-27 RX ORDER — OXYTOCIN 10 [USP'U]/ML
10 INJECTION, SOLUTION INTRAMUSCULAR; INTRAVENOUS
Status: DISCONTINUED | OUTPATIENT
Start: 2023-10-27 | End: 2023-10-29 | Stop reason: HOSPADM

## 2023-10-27 RX ORDER — CEFAZOLIN SODIUM 2 G/100ML
2 INJECTION, SOLUTION INTRAVENOUS SEE ADMIN INSTRUCTIONS
Status: DISCONTINUED | OUTPATIENT
Start: 2023-10-27 | End: 2023-10-27 | Stop reason: HOSPADM

## 2023-10-27 RX ORDER — FENTANYL CITRATE 50 UG/ML
INJECTION, SOLUTION INTRAMUSCULAR; INTRAVENOUS
Status: COMPLETED | OUTPATIENT
Start: 2023-10-27 | End: 2023-10-27

## 2023-10-27 RX ORDER — MORPHINE SULFATE 1 MG/ML
INJECTION, SOLUTION EPIDURAL; INTRATHECAL; INTRAVENOUS
Status: COMPLETED | OUTPATIENT
Start: 2023-10-27 | End: 2023-10-27

## 2023-10-27 RX ORDER — LIDOCAINE 40 MG/G
CREAM TOPICAL
Status: DISCONTINUED | OUTPATIENT
Start: 2023-10-27 | End: 2023-10-27 | Stop reason: HOSPADM

## 2023-10-27 RX ORDER — MISOPROSTOL 200 UG/1
400 TABLET ORAL
Status: DISCONTINUED | OUTPATIENT
Start: 2023-10-27 | End: 2023-10-29 | Stop reason: HOSPADM

## 2023-10-27 RX ORDER — MORPHINE SULFATE 0.5 MG/ML
150 INJECTION, SOLUTION EPIDURAL; INTRATHECAL; INTRAVENOUS ONCE
Status: DISCONTINUED | OUTPATIENT
Start: 2023-10-27 | End: 2023-10-27 | Stop reason: HOSPADM

## 2023-10-27 RX ORDER — ONDANSETRON 2 MG/ML
4 INJECTION INTRAMUSCULAR; INTRAVENOUS EVERY 6 HOURS PRN
Status: DISCONTINUED | OUTPATIENT
Start: 2023-10-27 | End: 2023-10-29 | Stop reason: HOSPADM

## 2023-10-27 RX ORDER — FENTANYL CITRATE 50 UG/ML
20 INJECTION, SOLUTION INTRAMUSCULAR; INTRAVENOUS ONCE
Status: DISCONTINUED | OUTPATIENT
Start: 2023-10-27 | End: 2023-10-27 | Stop reason: HOSPADM

## 2023-10-27 RX ORDER — PROCHLORPERAZINE MALEATE 10 MG
10 TABLET ORAL EVERY 6 HOURS PRN
Status: DISCONTINUED | OUTPATIENT
Start: 2023-10-27 | End: 2023-10-29 | Stop reason: HOSPADM

## 2023-10-27 RX ORDER — CARBOPROST TROMETHAMINE 250 UG/ML
250 INJECTION, SOLUTION INTRAMUSCULAR
Status: DISCONTINUED | OUTPATIENT
Start: 2023-10-27 | End: 2023-10-29 | Stop reason: HOSPADM

## 2023-10-27 RX ORDER — OXYCODONE HYDROCHLORIDE 5 MG/1
5 TABLET ORAL EVERY 4 HOURS PRN
Status: DISCONTINUED | OUTPATIENT
Start: 2023-10-27 | End: 2023-10-29 | Stop reason: HOSPADM

## 2023-10-27 RX ORDER — OXYTOCIN/0.9 % SODIUM CHLORIDE 30/500 ML
340 PLASTIC BAG, INJECTION (ML) INTRAVENOUS CONTINUOUS PRN
Status: DISCONTINUED | OUTPATIENT
Start: 2023-10-27 | End: 2023-10-29 | Stop reason: HOSPADM

## 2023-10-27 RX ORDER — BUPIVACAINE HYDROCHLORIDE 7.5 MG/ML
INJECTION, SOLUTION INTRASPINAL
Status: COMPLETED | OUTPATIENT
Start: 2023-10-27 | End: 2023-10-27

## 2023-10-27 RX ORDER — SODIUM CHLORIDE, SODIUM LACTATE, POTASSIUM CHLORIDE, CALCIUM CHLORIDE 600; 310; 30; 20 MG/100ML; MG/100ML; MG/100ML; MG/100ML
INJECTION, SOLUTION INTRAVENOUS CONTINUOUS
Status: DISCONTINUED | OUTPATIENT
Start: 2023-10-27 | End: 2023-10-27 | Stop reason: HOSPADM

## 2023-10-27 RX ORDER — OXYTOCIN/0.9 % SODIUM CHLORIDE 30/500 ML
340 PLASTIC BAG, INJECTION (ML) INTRAVENOUS CONTINUOUS PRN
Status: DISCONTINUED | OUTPATIENT
Start: 2023-10-27 | End: 2023-10-27 | Stop reason: HOSPADM

## 2023-10-27 RX ORDER — AMOXICILLIN 250 MG
1 CAPSULE ORAL 2 TIMES DAILY
Status: DISCONTINUED | OUTPATIENT
Start: 2023-10-27 | End: 2023-10-29 | Stop reason: HOSPADM

## 2023-10-27 RX ORDER — IBUPROFEN 800 MG/1
800 TABLET, FILM COATED ORAL EVERY 6 HOURS
Status: DISCONTINUED | OUTPATIENT
Start: 2023-10-28 | End: 2023-10-29 | Stop reason: HOSPADM

## 2023-10-27 RX ORDER — NALOXONE HYDROCHLORIDE 0.4 MG/ML
0.4 INJECTION, SOLUTION INTRAMUSCULAR; INTRAVENOUS; SUBCUTANEOUS
Status: DISCONTINUED | OUTPATIENT
Start: 2023-10-27 | End: 2023-10-29 | Stop reason: HOSPADM

## 2023-10-27 RX ORDER — MISOPROSTOL 200 UG/1
400 TABLET ORAL
Status: DISCONTINUED | OUTPATIENT
Start: 2023-10-27 | End: 2023-10-27 | Stop reason: HOSPADM

## 2023-10-27 RX ORDER — DEXTROSE, SODIUM CHLORIDE, SODIUM LACTATE, POTASSIUM CHLORIDE, AND CALCIUM CHLORIDE 5; .6; .31; .03; .02 G/100ML; G/100ML; G/100ML; G/100ML; G/100ML
INJECTION, SOLUTION INTRAVENOUS CONTINUOUS
Status: DISCONTINUED | OUTPATIENT
Start: 2023-10-27 | End: 2023-10-27

## 2023-10-27 RX ORDER — AMOXICILLIN 250 MG
2 CAPSULE ORAL 2 TIMES DAILY
Status: DISCONTINUED | OUTPATIENT
Start: 2023-10-27 | End: 2023-10-29 | Stop reason: HOSPADM

## 2023-10-27 RX ORDER — METHYLERGONOVINE MALEATE 0.2 MG/ML
200 INJECTION INTRAVENOUS
Status: DISCONTINUED | OUTPATIENT
Start: 2023-10-27 | End: 2023-10-27 | Stop reason: HOSPADM

## 2023-10-27 RX ORDER — CARBOPROST TROMETHAMINE 250 UG/ML
250 INJECTION, SOLUTION INTRAMUSCULAR
Status: DISCONTINUED | OUTPATIENT
Start: 2023-10-27 | End: 2023-10-27 | Stop reason: HOSPADM

## 2023-10-27 RX ORDER — OXYTOCIN 10 [USP'U]/ML
10 INJECTION, SOLUTION INTRAMUSCULAR; INTRAVENOUS
Status: DISCONTINUED | OUTPATIENT
Start: 2023-10-27 | End: 2023-10-27 | Stop reason: HOSPADM

## 2023-10-27 RX ORDER — MISOPROSTOL 200 UG/1
800 TABLET ORAL
Status: DISCONTINUED | OUTPATIENT
Start: 2023-10-27 | End: 2023-10-29 | Stop reason: HOSPADM

## 2023-10-27 RX ORDER — ONDANSETRON 2 MG/ML
INJECTION INTRAMUSCULAR; INTRAVENOUS PRN
Status: DISCONTINUED | OUTPATIENT
Start: 2023-10-27 | End: 2023-10-27

## 2023-10-27 RX ORDER — MISOPROSTOL 200 UG/1
800 TABLET ORAL
Status: DISCONTINUED | OUTPATIENT
Start: 2023-10-27 | End: 2023-10-27 | Stop reason: HOSPADM

## 2023-10-27 RX ORDER — BUPIVACAINE HYDROCHLORIDE 2.5 MG/ML
INJECTION, SOLUTION EPIDURAL; INFILTRATION; INTRACAUDAL
Status: COMPLETED | OUTPATIENT
Start: 2023-10-27 | End: 2023-10-27

## 2023-10-27 RX ORDER — ONDANSETRON 4 MG/1
4 TABLET, ORALLY DISINTEGRATING ORAL EVERY 6 HOURS PRN
Status: DISCONTINUED | OUTPATIENT
Start: 2023-10-27 | End: 2023-10-29 | Stop reason: HOSPADM

## 2023-10-27 RX ORDER — MODIFIED LANOLIN
OINTMENT (GRAM) TOPICAL
Status: DISCONTINUED | OUTPATIENT
Start: 2023-10-27 | End: 2023-10-29 | Stop reason: HOSPADM

## 2023-10-27 RX ORDER — BISACODYL 10 MG
10 SUPPOSITORY, RECTAL RECTAL DAILY PRN
Status: DISCONTINUED | OUTPATIENT
Start: 2023-10-29 | End: 2023-10-29 | Stop reason: HOSPADM

## 2023-10-27 RX ORDER — KETOROLAC TROMETHAMINE 30 MG/ML
30 INJECTION, SOLUTION INTRAMUSCULAR; INTRAVENOUS EVERY 6 HOURS
Status: COMPLETED | OUTPATIENT
Start: 2023-10-27 | End: 2023-10-28

## 2023-10-27 RX ORDER — METOCLOPRAMIDE 10 MG/1
10 TABLET ORAL EVERY 6 HOURS PRN
Status: DISCONTINUED | OUTPATIENT
Start: 2023-10-27 | End: 2023-10-29 | Stop reason: HOSPADM

## 2023-10-27 RX ORDER — CITRIC ACID/SODIUM CITRATE 334-500MG
30 SOLUTION, ORAL ORAL
Status: COMPLETED | OUTPATIENT
Start: 2023-10-27 | End: 2023-10-27

## 2023-10-27 RX ORDER — PROCHLORPERAZINE 25 MG
25 SUPPOSITORY, RECTAL RECTAL EVERY 12 HOURS PRN
Status: DISCONTINUED | OUTPATIENT
Start: 2023-10-27 | End: 2023-10-29 | Stop reason: HOSPADM

## 2023-10-27 RX ORDER — SIMETHICONE 80 MG
80 TABLET,CHEWABLE ORAL 4 TIMES DAILY PRN
Status: DISCONTINUED | OUTPATIENT
Start: 2023-10-27 | End: 2023-10-29 | Stop reason: HOSPADM

## 2023-10-27 RX ORDER — HYDROCORTISONE 25 MG/G
CREAM TOPICAL 3 TIMES DAILY PRN
Status: DISCONTINUED | OUTPATIENT
Start: 2023-10-27 | End: 2023-10-29 | Stop reason: HOSPADM

## 2023-10-27 RX ADMIN — BUPIVACAINE HYDROCHLORIDE IN DEXTROSE 1.6 ML: 7.5 INJECTION, SOLUTION SUBARACHNOID at 09:57

## 2023-10-27 RX ADMIN — ONDANSETRON 4 MG: 2 INJECTION INTRAMUSCULAR; INTRAVENOUS at 10:35

## 2023-10-27 RX ADMIN — BUPIVACAINE HYDROCHLORIDE 20 ML: 2.5 INJECTION, SOLUTION EPIDURAL; INFILTRATION; INTRACAUDAL at 10:57

## 2023-10-27 RX ADMIN — Medication 500 ML/HR: at 10:21

## 2023-10-27 RX ADMIN — PHENYLEPHRINE HYDROCHLORIDE 0.5 MCG/KG/MIN: 10 INJECTION INTRAVENOUS at 10:02

## 2023-10-27 RX ADMIN — SENNOSIDES AND DOCUSATE SODIUM 1 TABLET: 8.6; 5 TABLET ORAL at 22:38

## 2023-10-27 RX ADMIN — CEFAZOLIN SODIUM 2 G: 2 INJECTION, SOLUTION INTRAVENOUS at 10:06

## 2023-10-27 RX ADMIN — BUPIVACAINE 20 ML: 13.3 INJECTION, SUSPENSION, LIPOSOMAL INFILTRATION at 10:57

## 2023-10-27 RX ADMIN — ACETAMINOPHEN 975 MG: 325 TABLET ORAL at 09:22

## 2023-10-27 RX ADMIN — FENTANYL CITRATE 20 MCG: 50 INJECTION INTRAMUSCULAR; INTRAVENOUS at 09:57

## 2023-10-27 RX ADMIN — Medication 0.15 MG: at 09:57

## 2023-10-27 RX ADMIN — SODIUM CITRATE AND CITRIC ACID MONOHYDRATE 30 ML: 500; 334 SOLUTION ORAL at 09:23

## 2023-10-27 RX ADMIN — DEXAMETHASONE SODIUM PHOSPHATE 4 MG: 4 INJECTION, SOLUTION INTRA-ARTICULAR; INTRALESIONAL; INTRAMUSCULAR; INTRAVENOUS; SOFT TISSUE at 10:35

## 2023-10-27 RX ADMIN — SODIUM CHLORIDE, POTASSIUM CHLORIDE, SODIUM LACTATE AND CALCIUM CHLORIDE: 600; 310; 30; 20 INJECTION, SOLUTION INTRAVENOUS at 08:19

## 2023-10-27 ASSESSMENT — ACTIVITIES OF DAILY LIVING (ADL)
ADLS_ACUITY_SCORE: 18
ADLS_ACUITY_SCORE: 35
ADLS_ACUITY_SCORE: 18
ADLS_ACUITY_SCORE: 18

## 2023-10-27 NOTE — OP NOTE
[facility] LABOR & DELIVERY    SECTION - OPERATIVE NOTE      NAME:Lisa Hernandez  : 1990   MRN: [id number]   GESTATIONAL AGE: 10/31/2023    ADMISSION DATE: 10/27/2023     PCP:  Alla Dickinson     DATE OF SERVICE: 10/27/2023     PREOPERATIVE DIAGNOSIS:   33 year old y.o.  at 39w3d  previous C/S for repeat    POSTOPERATIVE DIAGNOSIS:  Same      PROCEDURE: Low transverse  section      SURGEON: Cammie Mayer DO    ASSISTANT: Dr. Jakob Brink    ANESTHESIA: spinal    Delivery QBL (mL): 641    DRAINS: Rojas catheter.    COMPLICATIONS: none    FINDINGS: Normal uterus, tubes and ovaries bilaterally.  Live female infant born with Apgars of 8 at one minute, 9 at 5 minutes, weight unavailable at time of dictation.    PROCEDURE:  Patient was met preoperatively where we discussed the procedure and the risks associated with the procedure.  She understood these to include but not limited to injury to adjacent organs including bowel, bladder, ureter, infection and bleeding. Questions were answered.  Consents were signed.      She was brought to the operating room in stable condition.  After induction of a spinal anesthetic, fetal heart tones were checked and were stable. She was carefully prepped and draped in sterile fashion for the procedure.  A timeout was then performed.      A Pfannenstiel skin incision was made with the scalpel and carried down to the underlying layer of fascia.  The fascia was was incised in the midline extended laterally with the Mulligan scissors.  The superior and inferior aspects of the rectus fascia were elevated up and the underlying rectus muscles dissected off with sharp and blunt techniques.  The rectus muscles were then  at the midline and the peritoneum was identified and entered.  This incision was then extended with good visualization of the bladder.  A bladder blade was introduced.  A low transverse uterine incision was made  revealing clear amniotic fluid.  The infant's head was then delivered atraumatically.  The shoulders and body were delivered without difficulty.  The cord was clamped x 2 and cut and the infant handed off to waiting nursing personnel.    The placenta was removed manually from the uterus.  The uterus was exteriorized, covered with a moist laparotomy sponge and cleared of all clots and debris.  The uterine incision was now closed with 0-vicryl in a running locked fashion.  Excellent hemostasis was obtained. The uterus was returned cavity.  The pericolic gutters were cleared of all clots and debris.  The uterine incision was again inspected and noted to be hemostatic.  The peritoneum was replaced.  The rectus muscles were brought together.  The fascia was closed with 0-vicryl and the skin was closed with Insorb staples.  Patient tolerated the procedure well.  Sponge, lap and needle counts were correct x two.    SPECIMENS SENT: cord segment    Cammie Mayer DO      CC: Cammie Mayer DO

## 2023-10-27 NOTE — ANESTHESIA POSTPROCEDURE EVALUATION
Patient: Lisa Hernandez    Procedure: Procedure(s):  REPEAT  SECTION       Anesthesia Type:  Spinal    Note:  Disposition: Inpatient   Postop Pain Control: Uneventful            Sign Out: Well controlled pain   PONV: No   Neuro/Psych: Uneventful            Sign Out: Acceptable/Baseline neuro status   Airway/Respiratory: Uneventful            Sign Out: Acceptable/Baseline resp. status   CV/Hemodynamics: Uneventful            Sign Out: Acceptable CV status; No obvious hypovolemia; No obvious fluid overload   Other NRE: NONE   DID A NON-ROUTINE EVENT OCCUR? No       Last vitals:  Vitals:    10/27/23 1130 10/27/23 1145 10/27/23 1200   BP: 126/75 122/75 134/76   Pulse: (!) 47 (!) 45 56   Resp: 16 16 16   Temp:   36.7  C (98  F)   SpO2: 98% 98% 97%       Electronically Signed By: Drake Taylor MD  2023  12:04 PM

## 2023-10-27 NOTE — PLAN OF CARE
Problem: Adult Inpatient Plan of Care  Goal: Optimal Comfort and Wellbeing  Outcome: Progressing   Goal Outcome Evaluation:      Plan of Care Reviewed With: patient    Overall Patient Progress: improvingOverall Patient Progress: improving.  Some anxiety, talking through things slow and explaining things.

## 2023-10-27 NOTE — H&P
[facility] LABOR & DELIVERY   HISTORY AND PHYSICAL UPDATE ADMISSION EXAM      NAME:Chavo Hernandez  : 1990   MRN: [id number]   GESTATIONAL AGE: 39w3d    ADMISSION DATE: 10/27/2023     PCP:  Alla Dickinson       Pregnancy History: This is a 32yo  @ 39w3d with EDC of 10/31/2023.  Patient is here for a RCS.    OBSTETRIC HISTORY:    OB History    Para Term  AB Living   2 2 2 0 0 2   SAB IAB Ectopic Multiple Live Births   0 0 0 0 2      # Outcome Date GA Lbr Reji/2nd Weight Sex Delivery Anes PTL Lv   2 Term 21 38w5d / 01:06 3.62 kg (7 lb 15.7 oz) F  EPI N THAD      Complications: Fetal Intolerance      Name: VINNIEFEMALE-CHAVO      Apgar1: 9  Apgar5: 9   1 Term 19 41w2d 11:00 / 01:51 3.629 kg (8 lb) M CS-LVertical Spinal N THAD      Complications: Fetal Intolerance      Name: VINNIEMALE-CHAVO      Apgar1: 1  Apgar5: 7       EDC: Estimated Date of Delivery: 10/31/2023    EGA: 39w3d      Prenatal Complications   Patient Active Problem List   Diagnosis    NO ACTIVE PROBLEMS    CARDIOVASCULAR SCREENING; LDL GOAL LESS THAN 160       Exam:      /83 (BP Location: Left arm, Patient Position: Semi-Baxter's, Cuff Size: Adult Regular)   Pulse (!) 49   Temp 98.4  F (36.9  C) (Oral)   Resp 16     Fetal heart Rate Tracing: reactive and reassuring  Contractions: acontractile    HEENT  negative  Heart       Lungs      Abdomen   Abdomen soft, non-tender. BS normal. No masses, organomegaly  Extremities  Normal, Warm, No cyanosis, no clubbing, No edema, and nontender    Vaginal exam: deferred    Assessment: 32yo  @ 39w3d here for [facility]     Plan: Admit - see IP orders  Prepare for  section  Risks and benefits were discussed with patient, including but not limited to bleeding, infection, injury to other organs.  Questions answered.  Consent obtained.      Prenatal record reviewed.    Cammie Mayer DO on 10/27/2023 at 9:00 AM

## 2023-10-27 NOTE — ANESTHESIA PREPROCEDURE EVALUATION
Anesthesia Pre-Procedure Evaluation    Patient: Lisa Hernandez   MRN: [id number] : 1990        Procedure : Procedure(s):  REPEAT  SECTION          Past Medical History:   Diagnosis Date     NO ACTIVE PROBLEMS       (vaginal birth after ) 2021      Past Surgical History:   Procedure Laterality Date      SECTION N/A 3/29/2019    Procedure:  SECTION;  Surgeon: Juani Maloney MD;  Location: [facility];  Service: Obstetrics     NO HISTORY OF SURGERY       WISDOM TOOTH EXTRACTION Bilateral     in high school      No Known Allergies   Social History     Tobacco Use     Smoking status: Never     Smokeless tobacco: Never   Substance Use Topics     Alcohol use: No      Wt Readings from Last 1 Encounters:   22 66.2 kg (146 lb)        Anesthesia Evaluation   Pt has had prior anesthetic. Type: Regional.    No history of anesthetic complications       ROS/MED HX  ENT/Pulmonary:  - neg pulmonary ROS     Neurologic:  - neg neurologic ROS     Cardiovascular:  - neg cardiovascular ROS     METS/Exercise Tolerance:     Hematologic:  - neg hematologic  ROS     Musculoskeletal:  - neg musculoskeletal ROS     GI/Hepatic:  - neg GI/hepatic ROS  (-) GERD   Renal/Genitourinary:  - neg Renal ROS     Endo:  - neg endo ROS     Psychiatric/Substance Use:  - neg psychiatric ROS     Infectious Disease:  - neg infectious disease ROS     Malignancy:  - neg malignancy ROS     Other:      (+) Possibly pregnant, , ,       Physical Exam    Airway        Mallampati: I   TM distance: > 3 FB   Neck ROM: full   Mouth opening: > 3 cm    Respiratory Devices and Support         Dental       (+) Minor Abnormalities - some fillings, tiny chips      Cardiovascular          Rhythm and rate: regular and normal     Pulmonary           breath sounds clear to auscultation       OUTSIDE LABS:  CBC:   Lab Results   Component Value Date    WBC 7.1 2023    WBC 10.3 2021    HGB 13.2 10/27/2023  "   HGB 13.3 03/23/2023    HCT 39.8 03/23/2023    HCT 38.0 06/06/2021     03/23/2023     06/06/2021     BMP:   Lab Results   Component Value Date     11/11/2020    POTASSIUM 3.9 11/11/2020    CHLORIDE 106 11/11/2020    CO2 22 11/11/2020    BUN 10 11/11/2020    CR 0.72 11/11/2020    GLC 91 11/11/2020     COAGS:   Lab Results   Component Value Date    INR 1.01 03/29/2019     POC: No results found for: \"BGM\", \"HCG\", \"HCGS\"  HEPATIC:   Lab Results   Component Value Date    ALBUMIN 4.0 11/11/2020    PROTTOTAL 6.8 11/11/2020    ALT 12 11/11/2020    AST 14 11/11/2020    ALKPHOS 29 (L) 11/11/2020    BILITOTAL 0.6 11/11/2020     OTHER:   Lab Results   Component Value Date    EBONIE 9.2 11/11/2020    TSH 0.63 03/26/2012       Anesthesia Plan    ASA Status:  2       Anesthesia Type: Spinal.              Consents    Anesthesia Plan(s) and associated risks, benefits, and realistic alternatives discussed. Questions answered and patient/representative(s) expressed understanding.     - Discussed: Risks, Benefits and Alternatives for the PROCEDURE were discussed     - Discussed with:  Patient            Postoperative Care    Pain management: intrathecal morphine, Peripheral nerve block (Single Shot).   PONV prophylaxis: Ondansetron (or other 5HT-3)     Comments:              Drake Taylor MD  "

## 2023-10-27 NOTE — ANESTHESIA CARE TRANSFER NOTE
Patient: Lisa Hernandez    Procedure: Procedure(s):  REPEAT  SECTION       Diagnosis: History of  section [Z98.891]  Other specified postprocedural states [Z98.890]  Diagnosis Additional Information: No value filed.    Anesthesia Type:   Spinal     Note:    Oropharynx: oropharynx clear of all foreign objects  Level of Consciousness: awake  Oxygen Supplementation: room air    Independent Airway: airway patency satisfactory and stable  Dentition: dentition unchanged  Vital Signs Stable: post-procedure vital signs reviewed and stable  Report to RN Given: handoff report given  Patient transferred to: Labor and Delivery    Handoff Report: Identifed the Patient, Identified the Reponsible Provider, Reviewed the pertinent medical history, Discussed the surgical course, Reviewed Intra-OP anesthesia mangement and issues during anesthesia, Set expectations for post-procedure period and Allowed opportunity for questions and acknowledgement of understanding      Vitals:  Vitals Value Taken Time   /77    Temp     Pulse 42    Resp     SpO2 98        Electronically Signed By: MARA Banks CRNA  2023  11:14 AM

## 2023-10-27 NOTE — ANESTHESIA PROCEDURE NOTES
TAP Procedure Note    Pre-Procedure   Staff -        Anesthesiologist:  Drake Taylor MD       Performed By: anesthesiologist       Location: OR       Procedure Start/Stop Times: 10/27/2023 10:57 AM and 10/27/2023 11:01 AM       Pre-Anesthestic Checklist: patient identified, IV checked, site marked, risks and benefits discussed, informed consent, monitors and equipment checked, pre-op evaluation, at physician/surgeon's request and post-op pain management  Timeout:       Correct Patient: Yes        Correct Procedure: Yes        Correct Site: Yes        Correct Position: Yes        Correct Laterality: Yes        Site Marked: Yes  Procedure Documentation  Procedure: TAP       Diagnosis: POST OP PAIN CONTROL       Laterality: bilateral       Patient Position: supine       Patient Prep/Sterile Barriers: sterile gloves, mask       Skin prep: Chloraprep       Needle Type: short bevel       Needle Gauge: 20.        Needle Length (Inches): 4        Ultrasound guided       1. Ultrasound was used to identify targeted nerve, plexus, vascular marker, or fascial plane and place a needle adjacent to it in real-time.       2. Ultrasound was used to visualize the spread of anesthetic in close proximity to the above referenced structure.       3. A permanent image is entered into the patient's record.       4. The visualized anatomic structures appeared normal.       5. There were no apparent abnormal pathologic findings.    Assessment/Narrative         The placement was negative for: blood aspirated, painful injection and site bleeding       Paresthesias: No.       Bolus given via needle. no blood aspirated via catheter.        Secured via.        Insertion/Infusion Method: Single Shot       Complications: none       Injection made incrementally with aspirations every 5 mL.    Medication(s) Administered   Bupivacaine 0.25% PF (Infiltration) - Infiltration   20 mL - 10/27/2023 10:57:00 AM  Bupivacaine liposome (Exparel) 1.3% LA inj  "susp (Infiltration) - Infiltration   20 mL - 10/27/2023 10:57:00 AM  Medication Administration Time: 10/27/2023 10:57 AM      FOR [facility] ONLY:   Pain Team Contact information: please page the Pain Team Via [employer]. Search \"Pain\". During daytime hours, please page the attending first. At night please page the resident first.      "

## 2023-10-27 NOTE — ANESTHESIA PROCEDURE NOTES
"Intrathecal injection Procedure Note    Pre-Procedure   Staff -        Anesthesiologist:  Drake Taylor MD       Performed By: anesthesiologist       Location: OR       Procedure Start/Stop Times: 10/27/2023 9:57 AM and 10/27/2023 10:01 AM       Pre-Anesthestic Checklist: patient identified, risks and benefits discussed, informed consent, monitors and equipment checked and pre-op evaluation  Timeout:       Correct Patient: Yes        Correct Procedure: Yes        Correct Site: Yes        Correct Position: Yes   Procedure Documentation  Procedure: intrathecal injection       Diagnosis: c section       Patient Position: sitting       Patient Prep/Sterile Barriers: sterile gloves, mask, patient draped       Skin prep: Chloraprep       Insertion Site: L3-4. (midline approach).       Needle Gauge: 24.        Needle Length (Inches): 4        Spinal Needle Type: Pencan       Introducer used       Introducer: 20 G       # of attempts: 1 and  # of redirects:  0    Assessment/Narrative         Paresthesias: No.       CSF fluid: clear.    Medication(s) Administered   0.75% Hyperbaric Bupivacaine (Intrathecal) - Intrathecal   1.6 mL - 10/27/2023 9:57:00 AM  Fentanyl PF (Intrathecal) - Intrathecal   20 mcg - 10/27/2023 9:57:00 AM  Morphine PF 1 mg/mL (Intrathecal) - Intrathecal   0.15 mg - 10/27/2023 9:57:00 AM  Medication Administration Time: 10/27/2023 9:57 AM      FOR [facility] ([facility] ONLY:   Pain Team Contact information: please page the Pain Team Via [employer]. Search \"Pain\". During daytime hours, please page the attending first. At night please page the resident first.      "

## 2023-10-28 LAB — HGB BLD-MCNC: 11.3 G/DL (ref 11.7–15.7)

## 2023-10-28 PROCEDURE — 36415 COLL VENOUS BLD VENIPUNCTURE: CPT | Performed by: OBSTETRICS & GYNECOLOGY

## 2023-10-28 PROCEDURE — 85018 HEMOGLOBIN: CPT | Performed by: OBSTETRICS & GYNECOLOGY

## 2023-10-28 PROCEDURE — 120N000001 HC R&B MED SURG/OB

## 2023-10-28 PROCEDURE — 250N000013 HC RX MED GY IP 250 OP 250 PS 637: Performed by: OBSTETRICS & GYNECOLOGY

## 2023-10-28 PROCEDURE — 250N000011 HC RX IP 250 OP 636: Mod: JZ | Performed by: OBSTETRICS & GYNECOLOGY

## 2023-10-28 RX ORDER — OXYCODONE HYDROCHLORIDE 5 MG/1
5 TABLET ORAL EVERY 6 HOURS PRN
Qty: 15 TABLET | Refills: 0 | Status: SHIPPED | OUTPATIENT
Start: 2023-10-28

## 2023-10-28 RX ADMIN — ACETAMINOPHEN 975 MG: 325 TABLET ORAL at 14:45

## 2023-10-28 RX ADMIN — ACETAMINOPHEN 975 MG: 325 TABLET ORAL at 08:47

## 2023-10-28 RX ADMIN — SENNOSIDES AND DOCUSATE SODIUM 2 TABLET: 8.6; 5 TABLET ORAL at 08:48

## 2023-10-28 RX ADMIN — IBUPROFEN 800 MG: 800 TABLET ORAL at 20:49

## 2023-10-28 RX ADMIN — IBUPROFEN 800 MG: 800 TABLET ORAL at 14:45

## 2023-10-28 RX ADMIN — ACETAMINOPHEN 975 MG: 325 TABLET ORAL at 20:48

## 2023-10-28 RX ADMIN — SENNOSIDES AND DOCUSATE SODIUM 2 TABLET: 8.6; 5 TABLET ORAL at 20:48

## 2023-10-28 RX ADMIN — KETOROLAC TROMETHAMINE 30 MG: 30 INJECTION, SOLUTION INTRAMUSCULAR; INTRAVENOUS at 00:15

## 2023-10-28 RX ADMIN — KETOROLAC TROMETHAMINE 30 MG: 30 INJECTION, SOLUTION INTRAMUSCULAR; INTRAVENOUS at 08:49

## 2023-10-28 RX ADMIN — ACETAMINOPHEN 975 MG: 325 TABLET ORAL at 00:15

## 2023-10-28 ASSESSMENT — ACTIVITIES OF DAILY LIVING (ADL)
ADLS_ACUITY_SCORE: 18

## 2023-10-28 NOTE — DISCHARGE SUMMARY
HOSPITAL DISCHARGE SUMMARY - VAGINAL BIRTH    Patient Name: Lisa Hernandez   YOB: 1990  Age: 33 year old  Medical Record Number: [id number]  Primary Physician: Alla Dickinson    Admission Date:  10/27/2023  Delivery Date:    Information for the patient's :  Migel Hernandez [[id number]]   10/27/2023      Discharge Date:  10/29/2023    REASON FOR ADMISSION: Labor and Delivery    DIAGNOSIS:    Intrauterine pregnancy at 39w3d    Information for the patient's :  Migel Hernandez [[id number]]   7 lbs 1 oz       PROCEDURES PERFORMED:   delivery     HISTORY OF PRESENT ILLNESS AND HOSPITAL COURSE: This is a 33 year old  female who underwent a delivery as above. Post partum course was unremarkable.     LABS:  Lab Results   Component Value Date    HGB 11.3 (L) 10/28/2023       DISPOSITION:  Home    CONDITION AT DISCHARGE:  Good/Stable    Discharge Medications:      Review of your medicines      You have not been prescribed any medications.         DISCHARGE PLAN for 10-  - Follow up with primary OB/GYN in 2 and 6 weeks  - Take medication as prescribed  - Physical activity: As tolerated, no heavy lifting. Pelvic rest.  - Diet:  Regular  - Medication:  Please see MAR  - Warning signs discussed with patient about when to call the clinic/hospital  - All questions and concerns were answered for the patient prior to discharge.     Beth Canchola MD 10/28/2023 10:13 AM

## 2023-10-28 NOTE — PROGRESS NOTES
"[facility]  Postpartum Day 3:     Subjective:  The patient feels well. Pain is well controlled with current medications. The patient is ambulating Often.  She is voiding and is not passing flatus. She has not had a bowel movement. The amount and color of the lochia is appropriate for the duration of recovery, patient denies clots. The baby is doing well and is Breast feeding.     Exam:   /75 (BP Location: Left arm)   Pulse 58   Temp 98  F (36.7  C) (Oral)   Resp 17   Ht 1.632 m (5' 4.25\")   Wt 81.2 kg (179 lb)   SpO2 100%   Breastfeeding Unknown   BMI 30.49 kg/m  .  General: NAD  Abdomen: soft, NT   Incision: C/D/I  Fundus: firm, nontender  Ext: no pain     Impression:   Normal postpartum course. POD#1 LTCS     Plan:   - Continue current care.  - DC Home tomorrow   - Follow up in 2 weeks for incisional check and 6 weeks for PP check.     Beth Canchola MD 10/28/2023 10:11 AM      "

## 2023-10-28 NOTE — LACTATION NOTE
This note was copied from a baby's chart.  Rounded on family for lactation support per patient request.  Armen delivered her 3rd baby and wanted some reassurance.  Armen  her first 2 for 9 months.  She had a healthy milk supply.  Armen has noticed that her baby is improving with her breastfeeding.  At this visit, baby was asleep in her bassinet having just fed.  This LC encouraged armen to feed at least every 3 hours and she feels that is happening.     Armen is concerned that baby has some muscle tension and a shallow gape.  Discussed gentle massage to baby's back, shoulders and jaws while holding her baby. Resources also provided per pt request for infant body work providers.    Reviewed use of education folder for self learning, lactation and community support, indicators to call MD and maternal/family well being.    Provided education and a resource/teaching sheet with QR codes for video support/education for:  Hand expressing and storing breastmilk  Achieving a Deep Asymmetrical Latch  Breastfeeding Positions  How to Choose a breast pump flange size   Side Lying paced bottle feeding if supplementation is needed.  Spectra breast pump use.    Questions encouraged and addressed.    Jaylin Thomas RNC, IBCLC

## 2023-10-28 NOTE — PLAN OF CARE
Day RN ([phone])     Recovery:  Pt recovering well from  at 1020 on 10/27/2023.  VSS and afebrile.  Pt states very minimal pain with surgical blocks in place, declining any pain intervention at this time.  CMS intact.  Dressing small amt of dried drainage that is marked.  Has ambulated with assistance at 1710.  Rojas cath in place until 1710 - removed and awaiting first void.  Tolerating regular diet well.  Fundus is firm, midline, and at the U.  Lochia wdl - no clots.  Breast feeding baby, bonding well.  Spouse at bedside - supportive of patient.  Plan is home with infant and  family on discharge.  Will continue to monitor.         Care Plan Problem    Problem: Adult Inpatient Plan of Care  Goal: Optimal Comfort and Wellbeing  Outcome: Progressing  Intervention: Provide Person-Centered Care  Recent Flowsheet Documentation  Taken 10/27/2023 1600 by Meghana Gaytan RN  Trust Relationship/Rapport:   care explained   choices provided   emotional support provided   empathic listening provided   questions answered   questions encouraged   reassurance provided   thoughts/feelings acknowledged     Problem: Postpartum ( Delivery)  Goal: Nausea and Vomiting Relief  Outcome: Progressing     Problem: Postpartum ( Delivery)  Goal: Effective Urinary Elimination  Outcome: Progressing

## 2023-10-28 NOTE — PLAN OF CARE
Goal Outcome Evaluation:    [id number]     Pt bonding with baby and vitals stable. Her fundus is midline and firm without massage and bleeding is scant. Her incision is covered with dressing that has a small amount of old drainage. Her pain is well managed with tylenol and ibuprofen. She is breastfeeding baby.         Problem: Postpartum ( Delivery)  Goal: Successful Parent Role Transition  Outcome: Progressing  Intervention: Support Parent Role Transition  Recent Flowsheet Documentation  Taken 10/28/2023 1816 by Digna Mcneil RN  Supportive Measures:   active listening utilized   self-care encouraged   self-reflection promoted  Parent-Child Attachment Promotion:   interaction encouraged   positive reinforcement provided   rooming-in promoted   skin-to-skin contact encouraged   strengths emphasized     Problem: Postpartum ( Delivery)  Goal: Absence of Infection Signs and Symptoms  Outcome: Progressing     Problem: Postpartum ( Delivery)  Goal: Optimal Pain Control and Function  Outcome: Progressing

## 2023-10-28 NOTE — PLAN OF CARE
Day RN ([phone])     Recovery:  Pt recovering well from  at 1020 on 10/27/2023.  VSS and afebrile.  Pain managed adequately with scheduled PO tylenol and Ibuprofen.  CMS intact.  Dressing small amt of dried drainage that is outlined w/o movement.  Has ambulated with assistance yesterday - now up ad jerome.  Voiding adequately.  Tolerating regular diet well.  Fundus is firm, midline, and at the U.  Lochia wdl - no clots.  Breast feeding baby, bonding well.  Spouse intermittently at bedside - supportive of patient.  Plan is home with infant and family on discharge.  Will continue to monitor.       Care Plan Problem    Problem: Adult Inpatient Plan of Care  Goal: Optimal Comfort and Wellbeing  Outcome: Progressing  Intervention: Provide Person-Centered Care  Recent Flowsheet Documentation  Taken 10/28/2023 0900 by Meghana Gaytan, RN  Trust Relationship/Rapport:   care explained   choices provided   emotional support provided   empathic listening provided   questions answered   questions encouraged   reassurance provided   thoughts/feelings acknowledged

## 2023-10-28 NOTE — PLAN OF CARE
"  Problem: Adult Inpatient Plan of Care  Goal: Plan of Care Review  Description: The Plan of Care Review/Shift note should be completed every shift.  The Outcome Evaluation is a brief statement about your assessment that the patient is improving, declining, or no change.  This information will be displayed automatically on your shift  note.  Outcome: Progressing  Goal: Patient-Specific Goal (Individualized)  Description: You can add care plan individualizations to a care plan. Examples of Individualization might be:  \"Parent requests to be called daily at 9am for status\", \"I have a hard time hearing out of my right ear\", or \"Do not touch me to wake me up as it startles  me\".  Outcome: Progressing  Goal: Absence of Hospital-Acquired Illness or Injury  Outcome: Progressing  Intervention: Prevent Skin Injury  Recent Flowsheet Documentation  Taken 10/28/2023 0015 by Nusrat Cline RN  Body Position: position changed independently  Taken 10/27/2023 2034 by Nusrat Cline RN  Body Position: position changed independently  Intervention: Prevent Infection  Recent Flowsheet Documentation  Taken 10/28/2023 0015 by Nusrat Cline RN  Infection Prevention: rest/sleep promoted  Taken 10/27/2023 2034 by Nusrat Cline RN  Infection Prevention: rest/sleep promoted  Goal: Optimal Comfort and Wellbeing  Outcome: Progressing  Intervention: Provide Person-Centered Care  Recent Flowsheet Documentation  Taken 10/28/2023 0015 by Nusrat Cline RN  Trust Relationship/Rapport:   care explained   choices provided   empathic listening provided   reassurance provided   thoughts/feelings acknowledged   questions answered  Taken 10/27/2023 2034 by Nusrat Cline RN  Trust Relationship/Rapport:   care explained   choices provided   empathic listening provided   reassurance provided   thoughts/feelings acknowledged   questions answered  Goal: Readiness for Transition of Care  Outcome: Progressing     Problem:  " Delivery  Goal: Bleeding is Controlled  Outcome: Progressing  Goal: Stable Fetal Wellbeing  Outcome: Progressing  Intervention: Promote and Monitor Fetal Wellbeing  Recent Flowsheet Documentation  Taken 10/28/2023 0015 by Nusrat Cline RN  Body Position: position changed independently  Taken 10/27/2023 2034 by Nusrat Cline RN  Body Position: position changed independently  Goal: Absence of Infection Signs and Symptoms  Outcome: Progressing  Intervention: Prevent or Manage Infection  Recent Flowsheet Documentation  Taken 10/28/2023 0015 by Nusrat Cline RN  Infection Prevention: rest/sleep promoted  Taken 10/27/2023 2034 by Nusrat Cline RN  Infection Prevention: rest/sleep promoted  Goal: Effective Oxygenation and Ventilation  Outcome: Progressing     Problem: Postpartum ( Delivery)  Goal: Successful Parent Role Transition  Outcome: Progressing  Goal: Hemostasis  Outcome: Progressing  Goal: Effective Bowel Elimination  Outcome: Progressing  Goal: Fluid and Electrolyte Balance  Outcome: Progressing  Goal: Absence of Infection Signs and Symptoms  Outcome: Progressing  Goal: Anesthesia/Sedation Recovery  Outcome: Progressing  Goal: Optimal Pain Control and Function  Outcome: Progressing  Goal: Nausea and Vomiting Relief  Outcome: Progressing  Goal: Effective Urinary Elimination  Outcome: Progressing  Goal: Effective Oxygenation and Ventilation  Outcome: Progressing  Intervention: Optimize Oxygenation and Ventilation  Recent Flowsheet Documentation  Taken 10/28/2023 0015 by Nusrat Cline RN  Head of Bed (HOB) Positioning: HOB at 30-45 degrees  Taken 10/27/2023 2034 by Nusrat Cline RN  Head of Bed (HOB) Positioning: HOB at 30-45 degrees     Problem: Breastfeeding  Goal: Effective Breastfeeding  Outcome: Progressing   Goal Outcome Evaluation:       Baby breastfeeding on demand every 2-3 hours. Pain controlled with tylenol and toradol. IV saline locked. Up independently, voiding without  difficulty. C/S incision clean, dry, intact with minimal drainage. Postpartum assessment WNL, see flowsheets for more information.  Caregiver education and support on-going.    Nusrat Cline RN

## 2023-10-29 VITALS
DIASTOLIC BLOOD PRESSURE: 70 MMHG | HEART RATE: 69 BPM | HEIGHT: 64 IN | BODY MASS INDEX: 29.43 KG/M2 | OXYGEN SATURATION: 98 % | WEIGHT: 172.4 LBS | RESPIRATION RATE: 16 BRPM | SYSTOLIC BLOOD PRESSURE: 121 MMHG | TEMPERATURE: 98.3 F

## 2023-10-29 PROCEDURE — 250N000013 HC RX MED GY IP 250 OP 250 PS 637: Performed by: OBSTETRICS & GYNECOLOGY

## 2023-10-29 RX ADMIN — ACETAMINOPHEN 975 MG: 325 TABLET ORAL at 03:10

## 2023-10-29 RX ADMIN — ACETAMINOPHEN 975 MG: 325 TABLET ORAL at 10:21

## 2023-10-29 RX ADMIN — IBUPROFEN 800 MG: 800 TABLET ORAL at 03:10

## 2023-10-29 RX ADMIN — IBUPROFEN 800 MG: 800 TABLET ORAL at 10:21

## 2023-10-29 RX ADMIN — SENNOSIDES AND DOCUSATE SODIUM 1 TABLET: 8.6; 5 TABLET ORAL at 10:21

## 2023-10-29 ASSESSMENT — ACTIVITIES OF DAILY LIVING (ADL)
ADLS_ACUITY_SCORE: 18

## 2023-10-29 NOTE — DISCHARGE SUMMARY
"[facility] Discharge Summary    Lisa Hernandez MRN# [id number]   Age: 33 year old YOB: 1990     Date of Admission:  10/27/2023  Date of Discharge::  10/29/2023  Admitting Physician:  Cammie Mayer DO  Discharge Physician:  Denita Mak MD             Admission Diagnoses:   History of  section [Z98.891]  Other specified postprocedural states [Z98.890]  Pregnancy [Z34.90]          Discharge Diagnosis:     History of  section [Z98.891]  Other specified postprocedural states [Z98.890]          Procedures:     Procedure(s): Repeat        No other procedures performed during this admission           Medications Prior to Admission:     No medications prior to admission.             Discharge Medications:     Current Discharge Medication List        START taking these medications    Details   oxyCODONE (ROXICODONE) 5 MG tablet Take 1 tablet (5 mg) by mouth every 6 hours as needed for breakthrough pain  Qty: 15 tablet, Refills: 0    Associated Diagnoses: Postpartum care and examination of lactating mother                   Consultations:   No consultations were requested during this admission          Brief History of Labor or Admission:   This is a 34yo  @ 39w3d with EDC of 10/31/2023.  Patient is here for a RCS.            Hospital Course:   The patient's hospital course was unremarkable.  She recovered as anticipated and experienced no post-operative complications.  On POD#2 patient was doing well and desired discharge to home.    PE:  /70 (BP Location: Left arm, Patient Position: Sitting, Cuff Size: Adult Regular)   Pulse 69   Temp 98.3  F (36.8  C) (Oral)   Resp 16   Ht 1.632 m (5' 4.25\")   Wt 78.2 kg (172 lb 6.4 oz)   SpO2 98%   Breastfeeding Unknown   BMI 29.36 kg/m      General: NAD  Abdomen: soft, nontender, incision c/d/i    Post-partum hemoglobin:   Hemoglobin   Date Value Ref Range Status   10/28/2023 11.3 (L) 11.7 - 15.7 g/dL " Final   03/26/2012 13.4 11.7 - 15.7 g/dL Final             Discharge Instructions and Follow-Up:     Discharge diet: Regular   Discharge activity: Lifting restricted to 10-15 pounds  No driving or operating machinery while on narcotic analgesics  Pelvic rest: abstain from intercourse and do not use tampons for 6 week(s)   Discharge follow-up: Folow-up in 2 weeks and 6 weeks for post-op visits               Discharge Disposition:     Discharged to home       Denita Mak MD

## 2023-10-29 NOTE — PLAN OF CARE
Problem: Adult Inpatient Plan of Care  Goal: Plan of Care Review  Description: The Plan of Care Review/Shift note should be completed every shift.  The Outcome Evaluation is a brief statement about your assessment that the patient is improving, declining, or no change.  This information will be displayed automatically on your shift  note.  Outcome: Progressing  Flowsheets (Taken 10/29/2023 05)  Plan of Care Reviewed With: patient     Problem: Postpartum ( Delivery)  Goal: Optimal Pain Control and Function  Outcome: Progressing     Problem: Postpartum ( Delivery)  Goal: Effective Bowel Elimination  Intervention: Enhance Bowel Motility and Elimination  Recent Flowsheet Documentation  Taken 10/29/2023 0200 by Mere Haider RN  Bowel Motility Enhancement: ambulation promoted  Bowel Elimination Promotion: adequate fluid intake promoted   Goal Outcome Evaluation:  S/P C/section Recovery  To maintain acceptable pain control.  States pain is at 2/10 when in bed, but is at 4/10 right now.  Patient is resting in bed comfortably, vitals are stable, fundus is firm, at the umbilicus and lochia is scant. Voiding spontaneously without issue, dressing to abdominal incision is clean, intact and marked with small dry saturated spots. Ambulating well,  passing flatus, but no bowel movement yet. no s/s of acute distress noted.      Plan of Care Reviewed With: patient

## 2023-10-29 NOTE — DISCHARGE INSTRUCTIONS
Postop  Birth Instructions    Activity     Do not lift more than 10 pounds for 6 weeks after surgery.  Ask family and friends for help when you need it.  No driving until you have stopped taking your pain medications (usually two weeks after surgery).  No heavy exercise or activity for 6 weeks.  Don't do anything that will put a strain on your surgery site.  Don't strain when using the toilet.  Your care team may prescribe a stool softener if you have problems with your bowel movements.     To care for your incision:     Keep the incision clean and dry.  Do not soak your incision in water. No swimming or hot tubs until it has fully healed. You may soak in the bathtub if the water level is below your incision.  Do not use peroxide, gel, cream, lotion, or ointment on your incision.  Adjust your clothes to avoid pressure on your surgery site (check the elastic in your underwear for example).     You may see a small amount of clear or pink drainage and this is normal.  Check with your health care provider:     If the drainage increases or has an odor.  If the incision reddens, you have swelling, or develop a rash.  If you have increased pain and the medicine we prescribed doesn't help.  If you have a fever above 100.4 F (38 C) with or without chills when placing thermometer under your tongue.   The area around your incision (surgery wound), will feel numb.  This is normal. The numbness should go away in less than a year.     Keep your hands clean:  Always wash your hands before touching your incision (surgery wound). This helps reduce your risk of infection. If your hands aren't dirty, you may use an alcohol hand-rub to clean your hands. Keep your nails clean and short.    Call your healthcare provider if you have any of these symptoms:     You soak a sanitary pad with blood within 1 hour, or you see blood clots larger than a golf ball.  Bleeding that lasts more than 6 weeks.  Vaginal discharge that smells  bad.  Severe pain, cramping or tenderness in your lower belly area.  A need to urinate more frequently (use the toilet more often), more urgently (use the toilet very quickly), or it burns when you urinate.  Nausea and vomiting.  Redness, swelling or pain around a vein in your leg.  Problems breastfeeding or a red or painful area on your breast.  Chest pain and cough or are gasping for air.  Problems with coping with sadness, anxiety or depression. If you have concerns about hurting yourself or the baby, call your provider immediately.    You have questions or concerns after you return home.              Warning Signs after Having a Baby    Keep this paper on your fridge or somewhere else where you can see it.    Call your provider if you have any of these symptoms up to 12 weeks after having your baby.    Thoughts of hurting yourself or your baby  Pain in your chest or trouble breathing  Severe headache not helped by pain medicine  Eyesight concerns (blurry vision, seeing spots or flashes of light, other changes to eyesight)  Fainting, shaking or other signs of a seizure    Call 9-1-1 if you feel that it is an emergency.     The symptoms below can happen to anyone after giving birth. They can be very serious. Call your provider if you have any of these warning signs.    My provider s phone number: _______________________    Losing too much blood (hemorrhage)    Call your provider if you soak through a pad in less than an hour or pass blood clots bigger than a golf ball. These may be signs that you are bleeding too much.    Blood clots in the legs or lungs    After you give birth, your body naturally clots its blood to help prevent blood loss. Sometimes this increased clotting can happen in other areas of the body, like the legs or lungs. This can block your blood flow and be very dangerous.     Call your provider if you:  Have a red, swollen spot on the back of your leg that is warm or painful when you touch it.    Are coughing up blood.     Infection    Call your provider if you have any of these symptoms:  Fever of 100.4 F (38 C) or higher.  Pain or redness around your stitches if you had an incision.   Any yellow, white, or green fluid coming from places where you had stitches or surgery.    Mood Problems (postpartum depression)    Many people feel sad or have mood changes after having a baby. But for some people, these mood swings are worse.     Call your provider right away if you feel so anxious or nervous that you can't care for yourself or your baby.    Preeclampsia (high blood pressure)    Even if you didn't have high blood pressure when you were pregnant, you are at risk for the high blood pressure disease called preeclampsia. This risk can last up to 12 weeks after giving birth.     Call your provider if you have:   Pain on your right side under your rib cage  Sudden swelling in the hands and face    Remember: You know your body. If something doesn't feel right, get medical help.     For informational purposes only. Not to replace the advice of your health care provider. Copyright 2020 [facility]. All rights reserved. Clinically reviewed by Mine Jain, RNC-OB, MSN. [employer] 413765 - Rev 02/23.

## 2023-10-29 NOTE — PLAN OF CARE
Problem: Adult Inpatient Plan of Care  Goal: Plan of Care Review  Description: The Plan of Care Review/Shift note should be completed every shift.  The Outcome Evaluation is a brief statement about your assessment that the patient is improving, declining, or no change.  This information will be displayed automatically on your shift  note.  Outcome: Adequate for Care Transition     Problem: Adult Inpatient Plan of Care  Goal: Optimal Comfort and Wellbeing  Outcome: Adequate for Care Transition   Goal Outcome Evaluation:                      Pt VSS ,pain managed adequately, all discharge teaching complete.

## 2023-12-02 ENCOUNTER — HEALTH MAINTENANCE LETTER (OUTPATIENT)
Age: 33
End: 2023-12-02

## 2024-10-26 ENCOUNTER — IMMUNIZATION (OUTPATIENT)
Dept: PEDIATRICS | Facility: CLINIC | Age: 34
End: 2024-10-26
Payer: COMMERCIAL

## 2024-10-26 PROCEDURE — 90471 IMMUNIZATION ADMIN: CPT

## 2024-10-26 PROCEDURE — 90656 IIV3 VACC NO PRSV 0.5 ML IM: CPT

## (undated) DEVICE — SOL NACL 0.9% IRRIG 1000ML BOTTLE 2F7124

## (undated) DEVICE — GLOVE SURG PI ULTRA TOUCH M SZ 7 LF 42670

## (undated) DEVICE — CUSTOM PACK C-SECTION LHE

## (undated) DEVICE — PLATE GROUNDING ADULT W/CORD 9165L

## (undated) DEVICE — PREP CHLORAPREP 26ML TINTED HI-LITE ORANGE 930815

## (undated) DEVICE — SUCTION MANIFOLD NEPTUNE 2 SYS 1 PORT 702-025-000

## (undated) DEVICE — PACK MINOR SINGLE BASIN SSK3001

## (undated) DEVICE — SUTURE VICRYL+ 0 36IN CT-1 UND VCP946H

## (undated) DEVICE — SOL WATER IRRIG 1000ML BOTTLE 2F7114

## (undated) DEVICE — BLADE CLIPPER DISP 4406

## (undated) DEVICE — DRSG PRIMAPORE 04X11 3/4"

## (undated) DEVICE — SUCTION TIP YANKAUER W/O VENT K86

## (undated) RX ORDER — CEFAZOLIN SODIUM 1 G/3ML
INJECTION, POWDER, FOR SOLUTION INTRAMUSCULAR; INTRAVENOUS
Status: DISPENSED
Start: 2023-10-27

## (undated) RX ORDER — FENTANYL CITRATE 50 UG/ML
INJECTION, SOLUTION INTRAMUSCULAR; INTRAVENOUS
Status: DISPENSED
Start: 2023-10-27

## (undated) RX ORDER — BUPIVACAINE HYDROCHLORIDE 2.5 MG/ML
INJECTION, SOLUTION EPIDURAL; INFILTRATION; INTRACAUDAL
Status: DISPENSED
Start: 2023-10-27

## (undated) RX ORDER — MORPHINE SULFATE 1 MG/ML
INJECTION, SOLUTION EPIDURAL; INTRATHECAL; INTRAVENOUS
Status: DISPENSED
Start: 2023-10-27